# Patient Record
Sex: MALE | Race: WHITE | NOT HISPANIC OR LATINO | URBAN - METROPOLITAN AREA
[De-identification: names, ages, dates, MRNs, and addresses within clinical notes are randomized per-mention and may not be internally consistent; named-entity substitution may affect disease eponyms.]

---

## 2017-04-05 ENCOUNTER — EMERGENCY (EMERGENCY)
Facility: HOSPITAL | Age: 22
LOS: 1 days | Discharge: HOME | End: 2017-04-05
Attending: EMERGENCY MEDICINE
Payer: COMMERCIAL

## 2017-04-05 VITALS
TEMPERATURE: 98 F | WEIGHT: 207.01 LBS | DIASTOLIC BLOOD PRESSURE: 74 MMHG | SYSTOLIC BLOOD PRESSURE: 131 MMHG | OXYGEN SATURATION: 98 % | RESPIRATION RATE: 20 BRPM | HEIGHT: 66 IN | HEART RATE: 94 BPM

## 2017-04-05 DIAGNOSIS — T56.1X1A: ICD-10-CM

## 2017-04-05 DIAGNOSIS — Z90.89 ACQUIRED ABSENCE OF OTHER ORGANS: ICD-10-CM

## 2017-04-05 DIAGNOSIS — R20.2 PARESTHESIA OF SKIN: ICD-10-CM

## 2017-04-05 DIAGNOSIS — Z98.89 OTHER SPECIFIED POSTPROCEDURAL STATES: Chronic | ICD-10-CM

## 2017-04-05 LAB
ALBUMIN SERPL ELPH-MCNC: 4.4 G/DL — SIGNIFICANT CHANGE UP (ref 3.3–5.2)
ALP SERPL-CCNC: 62 U/L — SIGNIFICANT CHANGE UP (ref 40–120)
ALT FLD-CCNC: 26 U/L — SIGNIFICANT CHANGE UP
AMPHET UR-MCNC: NEGATIVE — SIGNIFICANT CHANGE UP
ANION GAP SERPL CALC-SCNC: 12 MMOL/L — SIGNIFICANT CHANGE UP (ref 5–17)
APPEARANCE UR: CLEAR — SIGNIFICANT CHANGE UP
AST SERPL-CCNC: 25 U/L — SIGNIFICANT CHANGE UP
BARBITURATES UR SCN-MCNC: NEGATIVE — SIGNIFICANT CHANGE UP
BASOPHILS # BLD AUTO: 0 K/UL — SIGNIFICANT CHANGE UP (ref 0–0.2)
BASOPHILS NFR BLD AUTO: 0.3 % — SIGNIFICANT CHANGE UP (ref 0–2)
BENZODIAZ UR-MCNC: NEGATIVE — SIGNIFICANT CHANGE UP
BILIRUB SERPL-MCNC: 0.5 MG/DL — SIGNIFICANT CHANGE UP (ref 0.4–2)
BILIRUB UR-MCNC: NEGATIVE — SIGNIFICANT CHANGE UP
BUN SERPL-MCNC: 19 MG/DL — SIGNIFICANT CHANGE UP (ref 8–20)
CALCIUM SERPL-MCNC: 9.9 MG/DL — SIGNIFICANT CHANGE UP (ref 8.6–10.2)
CHLORIDE SERPL-SCNC: 98 MMOL/L — SIGNIFICANT CHANGE UP (ref 98–107)
CO2 SERPL-SCNC: 30 MMOL/L — HIGH (ref 22–29)
COCAINE METAB.OTHER UR-MCNC: NEGATIVE — SIGNIFICANT CHANGE UP
COLOR SPEC: YELLOW — SIGNIFICANT CHANGE UP
CREAT SERPL-MCNC: 0.9 MG/DL — SIGNIFICANT CHANGE UP (ref 0.5–1.3)
DIFF PNL FLD: NEGATIVE — SIGNIFICANT CHANGE UP
EOSINOPHIL # BLD AUTO: 0.2 K/UL — SIGNIFICANT CHANGE UP (ref 0–0.5)
EOSINOPHIL NFR BLD AUTO: 2.3 % — SIGNIFICANT CHANGE UP (ref 0–5)
GLUCOSE SERPL-MCNC: 87 MG/DL — SIGNIFICANT CHANGE UP (ref 70–115)
GLUCOSE UR QL: NEGATIVE MG/DL — SIGNIFICANT CHANGE UP
HCT VFR BLD CALC: 43.3 % — SIGNIFICANT CHANGE UP (ref 42–52)
HGB BLD-MCNC: 14.6 G/DL — SIGNIFICANT CHANGE UP (ref 14–18)
KETONES UR-MCNC: NEGATIVE — SIGNIFICANT CHANGE UP
LEUKOCYTE ESTERASE UR-ACNC: NEGATIVE — SIGNIFICANT CHANGE UP
LYMPHOCYTES # BLD AUTO: 2.4 K/UL — SIGNIFICANT CHANGE UP (ref 1–4.8)
LYMPHOCYTES # BLD AUTO: 25.1 % — SIGNIFICANT CHANGE UP (ref 20–55)
MCHC RBC-ENTMCNC: 28.9 PG — SIGNIFICANT CHANGE UP (ref 27–31)
MCHC RBC-ENTMCNC: 33.7 G/DL — SIGNIFICANT CHANGE UP (ref 32–36)
MCV RBC AUTO: 85.6 FL — SIGNIFICANT CHANGE UP (ref 80–94)
METHADONE UR-MCNC: NEGATIVE — SIGNIFICANT CHANGE UP
MONOCYTES # BLD AUTO: 0.6 K/UL — SIGNIFICANT CHANGE UP (ref 0–0.8)
MONOCYTES NFR BLD AUTO: 6.1 % — SIGNIFICANT CHANGE UP (ref 3–10)
NEUTROPHILS # BLD AUTO: 6.3 K/UL — SIGNIFICANT CHANGE UP (ref 1.8–8)
NEUTROPHILS NFR BLD AUTO: 65.9 % — SIGNIFICANT CHANGE UP (ref 37–73)
NITRITE UR-MCNC: NEGATIVE — SIGNIFICANT CHANGE UP
OPIATES UR-MCNC: NEGATIVE — SIGNIFICANT CHANGE UP
PCP SPEC-MCNC: SIGNIFICANT CHANGE UP
PCP UR-MCNC: NEGATIVE — SIGNIFICANT CHANGE UP
PH UR: 6 — SIGNIFICANT CHANGE UP (ref 4.8–8)
PLATELET # BLD AUTO: 286 K/UL — SIGNIFICANT CHANGE UP (ref 150–400)
POTASSIUM SERPL-MCNC: 4.4 MMOL/L — SIGNIFICANT CHANGE UP (ref 3.5–5.3)
POTASSIUM SERPL-SCNC: 4.4 MMOL/L — SIGNIFICANT CHANGE UP (ref 3.5–5.3)
PROT SERPL-MCNC: 7.8 G/DL — SIGNIFICANT CHANGE UP (ref 6.6–8.7)
PROT UR-MCNC: NEGATIVE MG/DL — SIGNIFICANT CHANGE UP
RBC # BLD: 5.06 M/UL — SIGNIFICANT CHANGE UP (ref 4.6–6.2)
RBC # FLD: 14 % — SIGNIFICANT CHANGE UP (ref 11–15.6)
SODIUM SERPL-SCNC: 140 MMOL/L — SIGNIFICANT CHANGE UP (ref 135–145)
SP GR SPEC: 1.01 — SIGNIFICANT CHANGE UP (ref 1.01–1.02)
THC UR QL: NEGATIVE — SIGNIFICANT CHANGE UP
UROBILINOGEN FLD QL: NEGATIVE MG/DL — SIGNIFICANT CHANGE UP
WBC # BLD: 9.6 K/UL — SIGNIFICANT CHANGE UP (ref 4.8–10.8)
WBC # FLD AUTO: 9.6 K/UL — SIGNIFICANT CHANGE UP (ref 4.8–10.8)

## 2017-04-05 PROCEDURE — 85027 COMPLETE CBC AUTOMATED: CPT

## 2017-04-05 PROCEDURE — 99283 EMERGENCY DEPT VISIT LOW MDM: CPT

## 2017-04-05 PROCEDURE — 80307 DRUG TEST PRSMV CHEM ANLYZR: CPT

## 2017-04-05 PROCEDURE — 81003 URINALYSIS AUTO W/O SCOPE: CPT

## 2017-04-05 PROCEDURE — 99284 EMERGENCY DEPT VISIT MOD MDM: CPT

## 2017-04-05 PROCEDURE — 83825 ASSAY OF MERCURY: CPT

## 2017-04-05 PROCEDURE — 80053 COMPREHEN METABOLIC PANEL: CPT

## 2017-04-05 NOTE — ED STATDOCS - OBJECTIVE STATEMENT
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 Pt reports that he experienced frequent mood swings, intermittent diffuse red rash across his body, and paranoia when attempting to sleep yesterday evening. He also awoke this morning with intermittent episodes of tingling in his hands. Pt reports that he called his neurologist, who asked if he had been eating a lot of sushi/seafood lately. Pt replied that he has been eating sushi and tuna frequently x 1 week, and his neurologist advised pt to be evaluated in the ED and tested for mercury poisoning. Denies fever, n/v, abd pain, CP, SOB. No further complaints at this time.

## 2017-04-05 NOTE — ED ADULT TRIAGE NOTE - CHIEF COMPLAINT QUOTE
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

## 2017-04-05 NOTE — ED ADULT NURSE NOTE - CHIEF COMPLAINT QUOTE
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

## 2017-04-05 NOTE — ED STATDOCS - CARE PLAN
Principal Discharge DX:	Irritability Principal Discharge DX:	Mercury overdose, undetermined intent, sequela

## 2017-04-05 NOTE — ED STATDOCS - ATTENDING CONTRIBUTION TO CARE
I, Chilango Belle, performed the initial face to face bedside interview with this patient regarding history of present illness, review of symptoms and relevant past medical, social and family history.  I completed an independent physical examination.  I was the initial provider who evaluated this patient. I have signed out the follow up of any pending tests (i.e. labs, radiological studies) to the ACP.  I have communicated the patient’s plan of care and disposition with the ACP.  The history, relevant review of systems, past medical and surgical history, medical decision making, and physical examination was documented by the scribe in my presence and I attest to the accuracy of the documentation.

## 2017-04-05 NOTE — ED STATDOCS - NS ED MD SCRIBE ATTENDING SCRIBE SECTIONS
PHYSICAL EXAM/PAST MEDICAL/SURGICAL/SOCIAL HISTORY/VITAL SIGNS( Pullset)/HISTORY OF PRESENT ILLNESS/REVIEW OF SYSTEMS/DISPOSITION/HIV

## 2017-04-07 LAB — MERCURY SERPL-MCNC: 16.5 UG/L — HIGH (ref 0–14.9)

## 2018-02-11 ENCOUNTER — EMERGENCY (EMERGENCY)
Facility: HOSPITAL | Age: 23
LOS: 1 days | Discharge: HOME | End: 2018-02-11
Attending: STUDENT IN AN ORGANIZED HEALTH CARE EDUCATION/TRAINING PROGRAM | Admitting: STUDENT IN AN ORGANIZED HEALTH CARE EDUCATION/TRAINING PROGRAM
Payer: COMMERCIAL

## 2018-02-11 VITALS
HEIGHT: 63 IN | OXYGEN SATURATION: 99 % | SYSTOLIC BLOOD PRESSURE: 130 MMHG | TEMPERATURE: 98 F | HEART RATE: 79 BPM | RESPIRATION RATE: 22 BRPM | WEIGHT: 220.02 LBS | DIASTOLIC BLOOD PRESSURE: 83 MMHG

## 2018-02-11 DIAGNOSIS — Z98.89 OTHER SPECIFIED POSTPROCEDURAL STATES: Chronic | ICD-10-CM

## 2018-02-11 PROCEDURE — 99285 EMERGENCY DEPT VISIT HI MDM: CPT

## 2018-02-11 RX ORDER — SODIUM CHLORIDE 9 MG/ML
1000 INJECTION INTRAMUSCULAR; INTRAVENOUS; SUBCUTANEOUS
Qty: 0 | Refills: 0 | Status: DISCONTINUED | OUTPATIENT
Start: 2018-02-11 | End: 2018-02-15

## 2018-02-11 RX ORDER — SODIUM CHLORIDE 9 MG/ML
1000 INJECTION INTRAMUSCULAR; INTRAVENOUS; SUBCUTANEOUS ONCE
Qty: 0 | Refills: 0 | Status: COMPLETED | OUTPATIENT
Start: 2018-02-11 | End: 2018-02-11

## 2018-02-11 RX ORDER — SODIUM CHLORIDE 9 MG/ML
3 INJECTION INTRAMUSCULAR; INTRAVENOUS; SUBCUTANEOUS ONCE
Qty: 0 | Refills: 0 | Status: COMPLETED | OUTPATIENT
Start: 2018-02-11 | End: 2018-02-11

## 2018-02-11 RX ORDER — FAMOTIDINE 10 MG/ML
20 INJECTION INTRAVENOUS ONCE
Qty: 0 | Refills: 0 | Status: COMPLETED | OUTPATIENT
Start: 2018-02-11 | End: 2018-02-11

## 2018-02-11 RX ORDER — KETOROLAC TROMETHAMINE 30 MG/ML
30 SYRINGE (ML) INJECTION ONCE
Qty: 0 | Refills: 0 | Status: DISCONTINUED | OUTPATIENT
Start: 2018-02-11 | End: 2018-02-11

## 2018-02-11 RX ORDER — ONDANSETRON 8 MG/1
4 TABLET, FILM COATED ORAL ONCE
Qty: 0 | Refills: 0 | Status: COMPLETED | OUTPATIENT
Start: 2018-02-11 | End: 2018-02-12

## 2018-02-11 NOTE — ED PROVIDER NOTE - CONSTITUTIONAL, MLM
normal... Well appearing, well nourished, awake, alert, oriented to person, place, time/situation and in no apparent distress. Well appearing, well nourished, awake, alert, oriented to person, place, time/situation. No obvious distress.

## 2018-02-11 NOTE — ED PROVIDER NOTE - OBJECTIVE STATEMENT
y/o male pt with PMHx  PSHx appendectomy presents to the ED  ra rodarte pain onset 3 00 today.   diarhea blood in stool   on floor  cutting open stomacj   couldn't drive or move  pain worsens   sitting here     sharp pain regularl  grt backed up colnoscopy done  dr ramya molina 29 y/o male pt with PMHx Kleine-morin syndrome and PSHx appendectomy presents to the ED c/o abdominal pain onset 13:00 today. Pt describes pain as severe and sharp and states "it feels like someone is cutting open my stomach". He notes the pain worsens while driving and with movement. Pt states diarrhea, blood in stool, and irregular bowel movements is normal for him. Pt currently takes Lorazepam. No further complaints at this   PCP: Dr. Acevedo

## 2018-02-11 NOTE — ED PROVIDER NOTE - GASTROINTESTINAL, MLM
Abdomen soft, non-tender, no guarding. Belly significantly tender. No guard, no rebound, normal active bowel sounds.

## 2018-02-11 NOTE — ED PROVIDER NOTE - PROGRESS NOTE DETAILS
Labs are as noted. Patient with elevated creatine kinase. Upon further question patient states he recent has been working out and has been using the pre-workout supplements regularly. Labs are as noted. Patient with elevated creatine kinase. Upon further question patient states he recent has been working out heavily and has been using the pre-workout supplements and protein shakes regularly. Last work-out was two days ago. Patient states he is feeling better but feels pain is returning. He states it is sharp in nature. CT and lab results discussed with patient and mother. Will medicate further, hydrate, and re-check ck. Labs are as noted. Patient with elevated creatine kinase. Upon further question patient states he recent has been working out heavily and has been using the pre-workout supplements and protein shakes regularly. Last work-out was two days ago. Rhabdomyolysis likely secondary to working out with some component due to diet intake.

## 2018-02-11 NOTE — ED PROVIDER NOTE - CARE PLAN
Principal Discharge DX:	Mesenteric adenitis Principal Discharge DX:	Mesenteric adenitis  Secondary Diagnosis:	Rhabdomyolysis

## 2018-02-12 VITALS
HEART RATE: 64 BPM | TEMPERATURE: 99 F | SYSTOLIC BLOOD PRESSURE: 100 MMHG | OXYGEN SATURATION: 98 % | RESPIRATION RATE: 18 BRPM | DIASTOLIC BLOOD PRESSURE: 54 MMHG

## 2018-02-12 LAB
ALBUMIN SERPL ELPH-MCNC: 4.8 G/DL — SIGNIFICANT CHANGE UP (ref 3.3–5.2)
ALP SERPL-CCNC: 50 U/L — SIGNIFICANT CHANGE UP (ref 40–120)
ALT FLD-CCNC: 39 U/L — SIGNIFICANT CHANGE UP
AMPHET UR-MCNC: NEGATIVE — SIGNIFICANT CHANGE UP
ANION GAP SERPL CALC-SCNC: 17 MMOL/L — SIGNIFICANT CHANGE UP (ref 5–17)
APPEARANCE UR: CLEAR — SIGNIFICANT CHANGE UP
AST SERPL-CCNC: 63 U/L — HIGH
BARBITURATES UR SCN-MCNC: NEGATIVE — SIGNIFICANT CHANGE UP
BASOPHILS # BLD AUTO: 0 K/UL — SIGNIFICANT CHANGE UP (ref 0–0.2)
BASOPHILS NFR BLD AUTO: 0.4 % — SIGNIFICANT CHANGE UP (ref 0–2)
BENZODIAZ UR-MCNC: NEGATIVE — SIGNIFICANT CHANGE UP
BILIRUB SERPL-MCNC: 0.6 MG/DL — SIGNIFICANT CHANGE UP (ref 0.4–2)
BILIRUB UR-MCNC: NEGATIVE — SIGNIFICANT CHANGE UP
BUN SERPL-MCNC: 18 MG/DL — SIGNIFICANT CHANGE UP (ref 8–20)
CALCIUM SERPL-MCNC: 9.9 MG/DL — SIGNIFICANT CHANGE UP (ref 8.6–10.2)
CHLORIDE SERPL-SCNC: 98 MMOL/L — SIGNIFICANT CHANGE UP (ref 98–107)
CK MB CFR SERPL CALC: 2.6 NG/ML — SIGNIFICANT CHANGE UP (ref 0–6.7)
CK MB CFR SERPL CALC: 2.9 NG/ML — SIGNIFICANT CHANGE UP (ref 0–6.7)
CK SERPL-CCNC: 1589 U/L — HIGH (ref 30–200)
CK SERPL-CCNC: 2231 U/L — HIGH (ref 30–200)
CO2 SERPL-SCNC: 24 MMOL/L — SIGNIFICANT CHANGE UP (ref 22–29)
COCAINE METAB.OTHER UR-MCNC: NEGATIVE — SIGNIFICANT CHANGE UP
COLOR SPEC: YELLOW — SIGNIFICANT CHANGE UP
CREAT SERPL-MCNC: 0.86 MG/DL — SIGNIFICANT CHANGE UP (ref 0.5–1.3)
DIFF PNL FLD: NEGATIVE — SIGNIFICANT CHANGE UP
EOSINOPHIL # BLD AUTO: 0.9 K/UL — HIGH (ref 0–0.5)
EOSINOPHIL NFR BLD AUTO: 7.3 % — HIGH (ref 0–5)
GLUCOSE SERPL-MCNC: 84 MG/DL — SIGNIFICANT CHANGE UP (ref 70–115)
GLUCOSE UR QL: NEGATIVE MG/DL — SIGNIFICANT CHANGE UP
HCT VFR BLD CALC: 46.7 % — SIGNIFICANT CHANGE UP (ref 42–52)
HGB BLD-MCNC: 15.5 G/DL — SIGNIFICANT CHANGE UP (ref 14–18)
KETONES UR-MCNC: NEGATIVE — SIGNIFICANT CHANGE UP
LEUKOCYTE ESTERASE UR-ACNC: NEGATIVE — SIGNIFICANT CHANGE UP
LIDOCAIN IGE QN: 18 U/L — LOW (ref 22–51)
LYMPHOCYTES # BLD AUTO: 28.5 % — SIGNIFICANT CHANGE UP (ref 20–55)
LYMPHOCYTES # BLD AUTO: 3.6 K/UL — SIGNIFICANT CHANGE UP (ref 1–4.8)
MCHC RBC-ENTMCNC: 28.8 PG — SIGNIFICANT CHANGE UP (ref 27–31)
MCHC RBC-ENTMCNC: 33.2 G/DL — SIGNIFICANT CHANGE UP (ref 32–36)
MCV RBC AUTO: 86.6 FL — SIGNIFICANT CHANGE UP (ref 80–94)
METHADONE UR-MCNC: NEGATIVE — SIGNIFICANT CHANGE UP
MONOCYTES # BLD AUTO: 0.7 K/UL — SIGNIFICANT CHANGE UP (ref 0–0.8)
MONOCYTES NFR BLD AUTO: 5.8 % — SIGNIFICANT CHANGE UP (ref 3–10)
NEUTROPHILS # BLD AUTO: 7.3 K/UL — SIGNIFICANT CHANGE UP (ref 1.8–8)
NEUTROPHILS NFR BLD AUTO: 57.7 % — SIGNIFICANT CHANGE UP (ref 37–73)
NITRITE UR-MCNC: NEGATIVE — SIGNIFICANT CHANGE UP
OPIATES UR-MCNC: NEGATIVE — SIGNIFICANT CHANGE UP
PCP SPEC-MCNC: SIGNIFICANT CHANGE UP
PCP UR-MCNC: NEGATIVE — SIGNIFICANT CHANGE UP
PH UR: 6 — SIGNIFICANT CHANGE UP (ref 5–8)
PLATELET # BLD AUTO: 325 K/UL — SIGNIFICANT CHANGE UP (ref 150–400)
POTASSIUM SERPL-MCNC: 4.8 MMOL/L — SIGNIFICANT CHANGE UP (ref 3.5–5.3)
POTASSIUM SERPL-SCNC: 4.8 MMOL/L — SIGNIFICANT CHANGE UP (ref 3.5–5.3)
PROT SERPL-MCNC: 8.5 G/DL — SIGNIFICANT CHANGE UP (ref 6.6–8.7)
PROT UR-MCNC: NEGATIVE MG/DL — SIGNIFICANT CHANGE UP
RBC # BLD: 5.39 M/UL — SIGNIFICANT CHANGE UP (ref 4.6–6.2)
RBC # FLD: 13.5 % — SIGNIFICANT CHANGE UP (ref 11–15.6)
SODIUM SERPL-SCNC: 139 MMOL/L — SIGNIFICANT CHANGE UP (ref 135–145)
SP GR SPEC: 1.01 — SIGNIFICANT CHANGE UP (ref 1.01–1.02)
THC UR QL: NEGATIVE — SIGNIFICANT CHANGE UP
UROBILINOGEN FLD QL: NEGATIVE MG/DL — SIGNIFICANT CHANGE UP
WBC # BLD: 12.7 K/UL — HIGH (ref 4.8–10.8)
WBC # FLD AUTO: 12.7 K/UL — HIGH (ref 4.8–10.8)

## 2018-02-12 PROCEDURE — 80307 DRUG TEST PRSMV CHEM ANLYZR: CPT

## 2018-02-12 PROCEDURE — 74177 CT ABD & PELVIS W/CONTRAST: CPT | Mod: 26

## 2018-02-12 PROCEDURE — 82553 CREATINE MB FRACTION: CPT

## 2018-02-12 PROCEDURE — 96374 THER/PROPH/DIAG INJ IV PUSH: CPT | Mod: XU

## 2018-02-12 PROCEDURE — 80053 COMPREHEN METABOLIC PANEL: CPT

## 2018-02-12 PROCEDURE — 36415 COLL VENOUS BLD VENIPUNCTURE: CPT

## 2018-02-12 PROCEDURE — 96375 TX/PRO/DX INJ NEW DRUG ADDON: CPT

## 2018-02-12 PROCEDURE — 85027 COMPLETE CBC AUTOMATED: CPT

## 2018-02-12 PROCEDURE — 83690 ASSAY OF LIPASE: CPT

## 2018-02-12 PROCEDURE — 82550 ASSAY OF CK (CPK): CPT

## 2018-02-12 PROCEDURE — 74177 CT ABD & PELVIS W/CONTRAST: CPT

## 2018-02-12 PROCEDURE — 81001 URINALYSIS AUTO W/SCOPE: CPT

## 2018-02-12 PROCEDURE — 96376 TX/PRO/DX INJ SAME DRUG ADON: CPT

## 2018-02-12 PROCEDURE — 99284 EMERGENCY DEPT VISIT MOD MDM: CPT | Mod: 25

## 2018-02-12 RX ORDER — IBUPROFEN 200 MG
1 TABLET ORAL
Qty: 20 | Refills: 0
Start: 2018-02-12 | End: 2018-02-16

## 2018-02-12 RX ORDER — MORPHINE SULFATE 50 MG/1
6 CAPSULE, EXTENDED RELEASE ORAL ONCE
Qty: 0 | Refills: 0 | Status: DISCONTINUED | OUTPATIENT
Start: 2018-02-12 | End: 2018-02-12

## 2018-02-12 RX ORDER — OXYCODONE AND ACETAMINOPHEN 5; 325 MG/1; MG/1
1 TABLET ORAL
Qty: 4 | Refills: 0
Start: 2018-02-12 | End: 2018-02-12

## 2018-02-12 RX ORDER — SODIUM CHLORIDE 9 MG/ML
2000 INJECTION INTRAMUSCULAR; INTRAVENOUS; SUBCUTANEOUS ONCE
Qty: 0 | Refills: 0 | Status: COMPLETED | OUTPATIENT
Start: 2018-02-12 | End: 2018-02-12

## 2018-02-12 RX ADMIN — MORPHINE SULFATE 6 MILLIGRAM(S): 50 CAPSULE, EXTENDED RELEASE ORAL at 05:06

## 2018-02-12 RX ADMIN — SODIUM CHLORIDE 125 MILLILITER(S): 9 INJECTION INTRAMUSCULAR; INTRAVENOUS; SUBCUTANEOUS at 00:57

## 2018-02-12 RX ADMIN — SODIUM CHLORIDE 1000 MILLILITER(S): 9 INJECTION INTRAMUSCULAR; INTRAVENOUS; SUBCUTANEOUS at 00:57

## 2018-02-12 RX ADMIN — FAMOTIDINE 20 MILLIGRAM(S): 10 INJECTION INTRAVENOUS at 00:57

## 2018-02-12 RX ADMIN — Medication 30 MILLIGRAM(S): at 01:12

## 2018-02-12 RX ADMIN — ONDANSETRON 4 MILLIGRAM(S): 8 TABLET, FILM COATED ORAL at 00:56

## 2018-02-12 RX ADMIN — MORPHINE SULFATE 6 MILLIGRAM(S): 50 CAPSULE, EXTENDED RELEASE ORAL at 01:58

## 2018-02-12 RX ADMIN — SODIUM CHLORIDE 3 MILLILITER(S): 9 INJECTION INTRAMUSCULAR; INTRAVENOUS; SUBCUTANEOUS at 00:57

## 2018-02-12 RX ADMIN — SODIUM CHLORIDE 2000 MILLILITER(S): 9 INJECTION INTRAMUSCULAR; INTRAVENOUS; SUBCUTANEOUS at 03:33

## 2018-02-12 RX ADMIN — Medication 30 MILLIGRAM(S): at 00:57

## 2018-02-12 NOTE — ED ADULT NURSE REASSESSMENT NOTE - NS ED NURSE REASSESS COMMENT FT1
Pt cleared for discharge , IV removed , no further bleeding noted from IV site , VSS stable  , no distress  noted , pt requesting w/c for discharge , mother at the bedside to take pt home

## 2018-02-12 NOTE — ED ADULT NURSE NOTE - OBJECTIVE STATEMENT
pt reports epigastric pain, diarrhea, nausea starting earlier today. pt is pale and diaphoretic at this time. a and o x3. anxious, sitting in bed. breathing even and unlabored. pt reports he has been taking c4 workout supplement for 3 weeks. pt has no other complaints at this time. will continue to monitor.

## 2018-02-12 NOTE — ED ADULT NURSE NOTE - CHPI ED SYMPTOMS NEG
no hematuria/no fever/no burning urination/no abdominal distension/no blood in stool/no dysuria/no vomiting/no chills

## 2018-10-13 ENCOUNTER — TRANSCRIPTION ENCOUNTER (OUTPATIENT)
Age: 23
End: 2018-10-13

## 2019-12-28 ENCOUNTER — EMERGENCY (EMERGENCY)
Facility: HOSPITAL | Age: 24
LOS: 1 days | Discharge: HOME | End: 2019-12-28
Attending: EMERGENCY MEDICINE
Payer: COMMERCIAL

## 2019-12-28 ENCOUNTER — TRANSCRIPTION ENCOUNTER (OUTPATIENT)
Age: 24
End: 2019-12-28

## 2019-12-28 ENCOUNTER — EMERGENCY (EMERGENCY)
Facility: HOSPITAL | Age: 24
LOS: 1 days | Discharge: HOME | End: 2019-12-28

## 2019-12-28 VITALS
SYSTOLIC BLOOD PRESSURE: 107 MMHG | OXYGEN SATURATION: 98 % | DIASTOLIC BLOOD PRESSURE: 73 MMHG | HEIGHT: 63 IN | TEMPERATURE: 99 F | RESPIRATION RATE: 20 BRPM | WEIGHT: 214.95 LBS | HEART RATE: 120 BPM

## 2019-12-28 VITALS
RESPIRATION RATE: 20 BRPM | HEART RATE: 78 BPM | OXYGEN SATURATION: 100 % | TEMPERATURE: 98 F | DIASTOLIC BLOOD PRESSURE: 71 MMHG | SYSTOLIC BLOOD PRESSURE: 107 MMHG

## 2019-12-28 DIAGNOSIS — Z98.89 OTHER SPECIFIED POSTPROCEDURAL STATES: Chronic | ICD-10-CM

## 2019-12-28 PROCEDURE — 96372 THER/PROPH/DIAG INJ SC/IM: CPT

## 2019-12-28 PROCEDURE — 99284 EMERGENCY DEPT VISIT MOD MDM: CPT | Mod: 25

## 2019-12-28 PROCEDURE — 74176 CT ABD & PELVIS W/O CONTRAST: CPT

## 2019-12-28 PROCEDURE — 99284 EMERGENCY DEPT VISIT MOD MDM: CPT

## 2019-12-28 PROCEDURE — 74176 CT ABD & PELVIS W/O CONTRAST: CPT | Mod: 26

## 2019-12-28 RX ORDER — KETOROLAC TROMETHAMINE 30 MG/ML
30 SYRINGE (ML) INJECTION ONCE
Refills: 0 | Status: DISCONTINUED | OUTPATIENT
Start: 2019-12-28 | End: 2019-12-28

## 2019-12-28 RX ORDER — ONDANSETRON 8 MG/1
4 TABLET, FILM COATED ORAL ONCE
Refills: 0 | Status: COMPLETED | OUTPATIENT
Start: 2019-12-28 | End: 2019-12-28

## 2019-12-28 RX ORDER — ONDANSETRON 8 MG/1
4 TABLET, FILM COATED ORAL ONCE
Refills: 0 | Status: DISCONTINUED | OUTPATIENT
Start: 2019-12-28 | End: 2019-12-28

## 2019-12-28 RX ORDER — SODIUM CHLORIDE 9 MG/ML
1000 INJECTION, SOLUTION INTRAVENOUS ONCE
Refills: 0 | Status: DISCONTINUED | OUTPATIENT
Start: 2019-12-28 | End: 2019-12-28

## 2019-12-28 RX ADMIN — Medication 30 MILLIGRAM(S): at 21:09

## 2019-12-28 RX ADMIN — ONDANSETRON 4 MILLIGRAM(S): 8 TABLET, FILM COATED ORAL at 21:08

## 2019-12-28 NOTE — ED STATDOCS - NSFOLLOWUPINSTRUCTIONS_ED_ALL_ED_FT
- Follow up with your doctor within 2-3 days.   - Return to the ED for any new or worsening symptoms.   - Ensure adequate hydration, drink plenty of water, gatorade, pedialyte    Diarrhea    Diarrhea is frequent loose or watery bowel movements that has many causes. Diarrhea can make you feel weak and cause you to become dehydrated. Diarrhea typically lasts 2–3 days, but can last longer if it is a sign of something more serious. Drink clear fluids to prevent dehydration. Eat bland, easy-to-digest foods as tolerated.     SEEK IMMEDIATE MEDICAL CARE IF YOU HAVE ANY OF THE FOLLOWING SYMPTOMS: high fevers, lightheadedness/dizziness, chest pain, black or bloody stools, shortness of breath, severe abdominal or back pain, or any signs of dehydration.     Please return to the emergency department immediately should you feel worse in any way or have any of the following symptoms:    •	especially increased or different pain  •	 fevers  •	persistent vomiting  •	shaking chills    Please return to the emergency department for a recheck in 12 hours so we can re-evaluate you and ensure that you are not developing a problem that would require surgery or hospitalization.      Please follow up with the Doctor listed within the time frame specified. Thank you for coming to the emergency department. We hope you are feeling improved and continue to get better. Have a nice day.

## 2019-12-28 NOTE — ED STATDOCS - PROGRESS NOTE DETAILS
PA note: PT evaluated by intake physician. HPI/PE/ROS as noted above. Will follow up plan per intake physician. CT results negative for any acute intraabdominal pathology PA note: CT negative. Pt does not want IV/needle stick. Pt hydrating orally. Will give zofran odt, toradol IM and send stool pcr. Pt provided copy of ct report, educated on return precautions and instructed to f/u PMD.

## 2019-12-28 NOTE — ED STATDOCS - CLINICAL SUMMARY MEDICAL DECISION MAKING FREE TEXT BOX
ABD pain, with N/V/D x 3 days; Iv fluids, Zofran, labs and consider ABD CT with no improvement in symptoms.

## 2019-12-28 NOTE — ED ADULT NURSE NOTE - CHIEF COMPLAINT QUOTE
Patient arrived to ED today with c/o abdominal pains, vomiting, diarrhea, for the past 4 days.  Patient was seen by PMD office two days ago.  Patient states after eating steak 4 days ago.

## 2019-12-28 NOTE — ED STATDOCS - PATIENT PORTAL LINK FT
You can access the FollowMyHealth Patient Portal offered by [facility] by registering at the following website: [email or web address]. By joining [employer]’s FollowMyHealth portal, you will also be able to view your health information using other applications (apps) compatible with our system.

## 2019-12-28 NOTE — ED STATDOCS - OBJECTIVE STATEMENT
25 y/o F pt with PSHX appendectomy presents to ED c/o N/V/D and progressive, severe ABD pain x 3 days. No emesis today, but reports >25 episodes of non bloody diarrhea. (+)  Other family members at home with similar symptoms. Tolerating PO. No further complaints at this time.

## 2019-12-28 NOTE — ED ADULT NURSE NOTE - OBJECTIVE STATEMENT
Patient c/o cramping  abdominal pains, vomiting, diarrhea, for the past 4 days. Denies blood in stools/vomit,  symptoms. Per patient they ate "bad food on [place]yan," pain went away but came back more severe today. Patient was recently on ABX about 1 month ago for pneumonia.

## 2019-12-28 NOTE — ED STATDOCS - NS_ ATTENDINGSCRIBEDETAILS _ED_A_ED_FT
I, Marck Nieves, performed the initial face to face bedside interview with this patient regarding history of present illness, review of symptoms and relevant past medical, social and family history.  I completed an independent physical examination.  I was the initial provider who evaluated this patient. I have signed out the follow up of any pending tests (i.e. labs, radiological studies) to the ACP.  I have communicated the patient’s plan of care and disposition with the ACP.  The history, relevant review of systems, past medical and surgical history, medical decision making, and physical examination was documented by the scribe in my presence and I attest to the accuracy of the documentation.

## 2020-03-02 ENCOUNTER — APPOINTMENT (OUTPATIENT)
Dept: ORTHOPEDIC SURGERY | Facility: CLINIC | Age: 25
End: 2020-03-02
Payer: COMMERCIAL

## 2020-03-02 ENCOUNTER — FORM ENCOUNTER (OUTPATIENT)
Age: 25
End: 2020-03-02

## 2020-03-02 VITALS
DIASTOLIC BLOOD PRESSURE: 82 MMHG | BODY MASS INDEX: 38.98 KG/M2 | HEIGHT: 63 IN | HEART RATE: 100 BPM | WEIGHT: 220 LBS | SYSTOLIC BLOOD PRESSURE: 132 MMHG

## 2020-03-02 VITALS — TEMPERATURE: 98.1 F

## 2020-03-02 PROBLEM — Z00.00 ENCOUNTER FOR PREVENTIVE HEALTH EXAMINATION: Status: ACTIVE | Noted: 2020-03-02

## 2020-03-02 PROCEDURE — 73110 X-RAY EXAM OF WRIST: CPT | Mod: RT

## 2020-03-02 PROCEDURE — 73090 X-RAY EXAM OF FOREARM: CPT | Mod: RT

## 2020-03-02 PROCEDURE — 99203 OFFICE O/P NEW LOW 30 MIN: CPT

## 2020-03-02 NOTE — PHYSICAL EXAM
[UE/LE] : Sensory: Intact in bilateral upper & lower extremities [ALL] : dorsalis pedis, posterior tibial, femoral, popliteal, and radial 2+ and symmetric bilaterally [Normal] : Oriented to person, place, and time, insight and judgement were intact and the affect was normal [Poor Appearance] : well-appearing [Acute Distress] : not in acute distress [de-identified] : Skin warm and dry, no erythema, no wounds, no lesions\par Hand \par Cap refill intact \par senstation intact to fingers \par \par  \par Wrist\par positive tenderness to ulna aspect of wrist, \par ROM passive and active decreased due to pain, supination/pronation causing pain.  \par Fist squeeze decreased secondary to pain \par compartments soft compressible to forearm \par  \par Radial/Ulna/Medial Nerves intact \par pulse intact \par \par \par \par \par \par \par \par  [de-identified] : 2 view right forearm reveals no acute findings \par 3 view right wrist reveals no acute findings

## 2020-03-02 NOTE — HISTORY OF PRESENT ILLNESS
[Pain Location] : pain [de-identified] : Patient is a 24 year old male who presents c/o right wrist pain. patient states pain started gradually over past week however has increased significantly over past 24 hours. patient states pain is located to ulna portion of wrist and radiates to forearm and hand. patient admits to swelling, states using ibuprofen with no relief.  patient states any movement increases pain, using wrist brace with no relief. patient denies any trauma that started pain, however was doing a lot of lifting at gym prior to onset of pain.  patient denies fevers or chills, had difficulty making a fist due to pain. patient states feels like ulna is subluxing in wrist. patient denies numbness to hand or fingers

## 2020-03-02 NOTE — RETURN TO WORK/SCHOOL
[Work] : work [___ Weeks] : I will re-evaluate the patient in [unfilled] week(s), at which time I will provide an update to their current status [FreeTextEntry1] : Please excuse patient from work due to right wrist injury

## 2020-03-02 NOTE — DISCUSSION/SUMMARY
[de-identified] : Discussion had with patient - patient with severe pain, feeling of ulna subluxing, need MRI to evaluate possible TFCC tear vs tenosynovitis \par Patient will follow up with hand\par Patients questions were answered and patient is satisfied with today's visit\par

## 2020-03-03 ENCOUNTER — OUTPATIENT (OUTPATIENT)
Dept: OUTPATIENT SERVICES | Facility: HOSPITAL | Age: 25
LOS: 1 days | End: 2020-03-03
Payer: COMMERCIAL

## 2020-03-03 ENCOUNTER — APPOINTMENT (OUTPATIENT)
Dept: MRI IMAGING | Facility: CLINIC | Age: 25
End: 2020-03-03
Payer: COMMERCIAL

## 2020-03-03 DIAGNOSIS — M25.531 PAIN IN RIGHT WRIST: ICD-10-CM

## 2020-03-03 DIAGNOSIS — Z98.89 OTHER SPECIFIED POSTPROCEDURAL STATES: Chronic | ICD-10-CM

## 2020-03-03 PROCEDURE — 73221 MRI JOINT UPR EXTREM W/O DYE: CPT | Mod: 26,RT

## 2020-03-03 PROCEDURE — 73221 MRI JOINT UPR EXTREM W/O DYE: CPT

## 2020-03-03 RX ORDER — IBUPROFEN 800 MG/1
800 TABLET, FILM COATED ORAL 3 TIMES DAILY
Qty: 60 | Refills: 0 | Status: ACTIVE | COMMUNITY
Start: 2020-03-03 | End: 1900-01-01

## 2020-03-05 ENCOUNTER — APPOINTMENT (OUTPATIENT)
Dept: ORTHOPEDIC SURGERY | Facility: CLINIC | Age: 25
End: 2020-03-05
Payer: COMMERCIAL

## 2020-03-05 VITALS
HEIGHT: 63 IN | BODY MASS INDEX: 38.98 KG/M2 | HEART RATE: 94 BPM | DIASTOLIC BLOOD PRESSURE: 72 MMHG | SYSTOLIC BLOOD PRESSURE: 125 MMHG | WEIGHT: 220 LBS

## 2020-03-05 DIAGNOSIS — G47.13 RECURRENT HYPERSOMNIA: ICD-10-CM

## 2020-03-05 DIAGNOSIS — Z78.9 OTHER SPECIFIED HEALTH STATUS: ICD-10-CM

## 2020-03-05 PROCEDURE — 99213 OFFICE O/P EST LOW 20 MIN: CPT

## 2020-03-07 PROBLEM — Z78.9 DOES NOT USE ILLICIT DRUGS: Status: ACTIVE | Noted: 2020-03-05

## 2020-03-07 PROBLEM — G47.13 KLEINE-LEVIN SYNDROME: Status: RESOLVED | Noted: 2020-03-05 | Resolved: 2020-03-07

## 2020-03-09 DIAGNOSIS — M25.531 PAIN IN RIGHT WRIST: ICD-10-CM

## 2020-03-09 RX ORDER — MELOXICAM 15 MG/1
15 TABLET ORAL DAILY
Qty: 14 | Refills: 0 | Status: ACTIVE | COMMUNITY
Start: 2020-03-09 | End: 1900-01-01

## 2020-03-12 ENCOUNTER — APPOINTMENT (OUTPATIENT)
Dept: ORTHOPEDIC SURGERY | Facility: CLINIC | Age: 25
End: 2020-03-12
Payer: COMMERCIAL

## 2020-03-12 VITALS
HEART RATE: 108 BPM | BODY MASS INDEX: 38.98 KG/M2 | SYSTOLIC BLOOD PRESSURE: 127 MMHG | DIASTOLIC BLOOD PRESSURE: 82 MMHG | TEMPERATURE: 98.2 F | WEIGHT: 220 LBS | HEIGHT: 63 IN

## 2020-03-12 DIAGNOSIS — M65.241 CALCIFIC TENDINITIS, RIGHT HAND: ICD-10-CM

## 2020-03-12 DIAGNOSIS — M65.4 RADIAL STYLOID TENOSYNOVITIS [DE QUERVAIN]: ICD-10-CM

## 2020-03-12 PROCEDURE — 20526 THER INJECTION CARP TUNNEL: CPT | Mod: 59,RT

## 2020-03-12 PROCEDURE — 20550 NJX 1 TENDON SHEATH/LIGAMENT: CPT | Mod: RT

## 2020-03-12 PROCEDURE — 99214 OFFICE O/P EST MOD 30 MIN: CPT | Mod: 25

## 2020-03-12 RX ORDER — METHYLPREDNISOLONE 4 MG/1
4 TABLET ORAL
Qty: 1 | Refills: 0 | Status: ACTIVE | COMMUNITY
Start: 2020-03-09 | End: 1900-01-01

## 2020-03-17 ENCOUNTER — APPOINTMENT (OUTPATIENT)
Dept: ORTHOPEDIC SURGERY | Facility: CLINIC | Age: 25
End: 2020-03-17

## 2020-03-23 ENCOUNTER — APPOINTMENT (OUTPATIENT)
Dept: RHEUMATOLOGY | Facility: CLINIC | Age: 25
End: 2020-03-23

## 2020-04-06 ENCOUNTER — APPOINTMENT (OUTPATIENT)
Dept: RHEUMATOLOGY | Facility: CLINIC | Age: 25
End: 2020-04-06

## 2022-01-01 NOTE — ED STATDOCS - PROGRESS NOTE DETAILS
1.27 PA NOTE: Pt seen by intake physician and orders/plan reviewed. agree with intake HPI AND PE. Pt is a 22 yo male with PMHx of Klien-morin syndrome   PE: GEN: Awake, alert,  NAD,  EYES: PERRL CARDIAC: Reg rate and rhythm, S1,S2, RRR  RESP: No distress noted. Lungs CTA bilaterally no wheeze, ronchi, rales. ABD: soft, supple, non-tender, no guarding. . NEURO: AOx3, no focal deficits   PLAN: PA NOTE: Pt seen by intake physician and orders/plan reviewed. agree with intake HPI AND PE. Pt is a 20 yo male with PMHx of Klien-morin syndrome c/o intermittent numbness and tingling of extremities, paranoia, irritability and sleepiness x today. pt reports he spoke to his neurologist who advised him to come to ed for evaluation of mercury poisoning due to increased intake of fish recently. pt denies fever, chills, cp, sob, hallucinations, suicide or homicide ideation. NKDA  PE: GEN: Awake, alert,  NAD,  EYES: PERRL CARDIAC: Reg rate and rhythm, S1,S2, RRR  RESP: No distress noted. Lungs CTA bilaterally no wheeze, ronchi, rales. ABD: soft, supple, non-tender, no guarding. . NEURO: AOx3, no focal deficits   PLAN:pt is a 20 yo male with multiple medical complaints. pt PE was benign. pt labs WNL. mercury lab pending. pt advised to follow up with neurologist and stop intake of fish. pt verbalized understanding and agreement with plan and dx tao d.c discussed with attending. case reviewed mercury level minimally elevated. Case discussed with PA. Advised consult with poison control Contacted Poison Control. Reviewed case. Contacted Poison Control. Reviewed case. ** recommended a fasting mercury level and a 24 urine.  Attempted to contact patient on listed phone number. Individual hung up. No pickup on recall. Letter sent to be sent patient.

## 2022-11-29 ENCOUNTER — EMERGENCY (EMERGENCY)
Facility: HOSPITAL | Age: 27
LOS: 1 days | Discharge: HOME | End: 2022-11-29
Attending: EMERGENCY MEDICINE
Payer: COMMERCIAL

## 2022-11-29 VITALS
SYSTOLIC BLOOD PRESSURE: 150 MMHG | RESPIRATION RATE: 16 BRPM | WEIGHT: 220.02 LBS | HEART RATE: 103 BPM | OXYGEN SATURATION: 99 % | TEMPERATURE: 98 F | HEIGHT: 63 IN | DIASTOLIC BLOOD PRESSURE: 90 MMHG

## 2022-11-29 DIAGNOSIS — Z98.89 OTHER SPECIFIED POSTPROCEDURAL STATES: Chronic | ICD-10-CM

## 2022-11-29 PROCEDURE — 73030 X-RAY EXAM OF SHOULDER: CPT | Mod: 26,RT

## 2022-11-29 PROCEDURE — 72131 CT LUMBAR SPINE W/O DYE: CPT | Mod: 26,MA

## 2022-11-29 PROCEDURE — 99284 EMERGENCY DEPT VISIT MOD MDM: CPT

## 2022-11-29 PROCEDURE — 72131 CT LUMBAR SPINE W/O DYE: CPT | Mod: MA

## 2022-11-29 PROCEDURE — 72128 CT CHEST SPINE W/O DYE: CPT | Mod: 26,MA

## 2022-11-29 PROCEDURE — 73030 X-RAY EXAM OF SHOULDER: CPT

## 2022-11-29 PROCEDURE — 73010 X-RAY EXAM OF SHOULDER BLADE: CPT | Mod: 26

## 2022-11-29 PROCEDURE — 71250 CT THORAX DX C-: CPT | Mod: MA

## 2022-11-29 PROCEDURE — 73010 X-RAY EXAM OF SHOULDER BLADE: CPT

## 2022-11-29 PROCEDURE — 71250 CT THORAX DX C-: CPT | Mod: 26,MA

## 2022-11-29 PROCEDURE — 99284 EMERGENCY DEPT VISIT MOD MDM: CPT | Mod: 25

## 2022-11-29 RX ORDER — IBUPROFEN 200 MG
400 TABLET ORAL ONCE
Refills: 0 | Status: COMPLETED | OUTPATIENT
Start: 2022-11-29 | End: 2022-11-29

## 2022-11-29 RX ORDER — LIDOCAINE 4 G/100G
1 CREAM TOPICAL ONCE
Refills: 0 | Status: COMPLETED | OUTPATIENT
Start: 2022-11-29 | End: 2022-11-29

## 2022-11-29 RX ORDER — ACETAMINOPHEN 500 MG
650 TABLET ORAL ONCE
Refills: 0 | Status: COMPLETED | OUTPATIENT
Start: 2022-11-29 | End: 2022-11-29

## 2022-11-29 RX ADMIN — Medication 400 MILLIGRAM(S): at 19:14

## 2022-11-29 RX ADMIN — Medication 400 MILLIGRAM(S): at 16:34

## 2022-11-29 RX ADMIN — LIDOCAINE 1 PATCH: 4 CREAM TOPICAL at 16:33

## 2022-11-29 RX ADMIN — Medication 650 MILLIGRAM(S): at 16:34

## 2022-11-29 NOTE — ED PROVIDER NOTE - CLINICAL SUMMARY MEDICAL DECISION MAKING FREE TEXT BOX
concern for possible scapilar/ rib fractures. Himanshu get imageing to eval for traumatic path. pain control and rreeval. tetanus up to date. Concern for possible scapular/ rib fractures. Will get imaging to eval for traumatic path. pain control and reeval. Tetanus up to date. Concern for possible scapular fracture or rib fractures. Will get imaging to eval for traumatic pathology. pain control and reeval. Tetanus up to date.

## 2022-11-29 NOTE — ED ADULT NURSE NOTE - CHIEF COMPLAINT QUOTE
pt fell, injuring his back on a metal guard rail and states a wheel [place] landed on him.  pt c/o right sided back pain, worse with inhalation and right hip pain.

## 2022-11-29 NOTE — ED PROVIDER NOTE - PATIENT PORTAL LINK FT
You can access the FollowMyHealth Patient Portal offered by [facility] by registering at the following website: [email or web address]. By joining [employer]’s FollowMyHealth portal, you will also be able to view your health information using other applications (apps) compatible with our system.

## 2022-11-29 NOTE — ED ADULT NURSE REASSESSMENT NOTE - NS ED NURSE REASSESS COMMENT FT1
assumed care for pt at 1930, charting as noted. PA at bedside. respirations even and unlabored. pt shows no s/s of acute distress at this time. safety precautions maintained.

## 2022-11-29 NOTE — ED PROVIDER NOTE - INTERNATIONAL TRAVEL
He can proceed with procedure.   He can HOLD Plavix 7 days prior to procedure; should ideally CONTINUE aspirin.  Resume Plavix after procedure.  NO need to antibiotics.  Thanks, AB   No

## 2022-11-29 NOTE — ED ADULT NURSE NOTE - OBJECTIVE STATEMENT
Patient was at work fell down a hole and landed backwards against guard rail, now having right shoulder/ back pain

## 2022-11-29 NOTE — ED ADULT NURSE NOTE - NS ED NOTE ABUSE SUSPICION NEGLECT YN
pt BIBA from NH for SOB x 1 day. as per EMS, lasix given at NH 60mg total. pt also lethargic, only responsive to painful stimuli. No

## 2022-11-29 NOTE — ED PROVIDER NOTE - NS ED ROS FT
REVIEW OF SYSTEMS:  General:  no fever, no chills, + diaphoretic  HEENT: no headache, no vision changes  Cardiac: no chest pain, no palpitations  Respiratory: no cough, no shortness of breath  Gastrointestinal: no abdominal pain, no nausea, no vomiting, no diarrhea, no melena, no hematochezia   Genitourinary: no hematuria, no dysuria, no urinary frequency, no urinary hesitancy, no vaginal bleeding or abnormal discharge  Extremities: no extremity swelling, no extremity pain  Neuro: no focal weakness, no numbness/tingling of the extremities, no decreased sensation  Heme: no easy bleeding, no easy bruising, no anemia  Skin: no abrasions, no jaundice, no pruritis, no rashes, no lesions  MSK: Diffused right sided pain in pelvis, back, and rib regions  -Gilda Beck MD Attending Physician REVIEW OF SYSTEMS:  General:  no fever, no chills, + diaphoretic  HEENT: no headache, no vision changes  Cardiac: +upper thoracic pain with inspiration  Respiratory: no cough, no shortness of breath  Gastrointestinal: no abdominal pain, no nausea, no vomiting, no diarrhea,   Neuro: no focal weakness, no numbness/tingling of the extremities, no decreased sensation  Skin: +abrasion to R back  MSK: Diffuse right sided pain in hip, back, and rib regions  -Gilda Beck MD Attending Physician

## 2022-11-29 NOTE — ED PROVIDER NOTE - PHYSICAL EXAMINATION
PHYSICAL EXAM:   General: Appears uncomfortable, appears stated age, not in extremis   HEENT: NC/AT, PERRLA, conjunctiva pink, airway patent  Cardiovascular: regular rate and rhythm, + S1/S2, no murmurs, rubs, gallops appreciated  Respiratory: clear to auscultation bilaterally, good aeration bilaterally, nonlabored respirations  Abdominal: soft, nontender, nondistended, no rebound, guarding or rigidity  Back: no costovertebral tenderness, no rashes noted  Extremities: no LE edema b/l. Radial pulses equal and strong b/l  Neuro: Alert and oriented x3. Moving all extremities. Ambulatory.  Psychiatric: appropriate mood and affect.   Skin: appropriate color, warm, dry.   MSK: abraision over riht thoraci cregon. TTP of tehe inferioe portion of scapula with some bulging w/ shouldrer ROM. Diffused TTP of shoulder w/lo gross defority or ecchymosis. Full rom of shoulder. No focal tenderness to rest of the arm. distally N/V intact. Midline thoracic lower midline ttp. Lower thoracic and upper lumbar parasinal ttp (paraspinal greater than midline). TTP at bilateral greater chocanters right greater than left q.o obviosu deformity or overlying lesions. Able toa mbulate Doog DO pulses sensations intact bilaterally.   -Gilda Beck MD Attending Physician PHYSICAL EXAM:   General: Appears uncomfortable, appears stated age, not in extremis   HEENT: NC/AT, PERRLA, conjunctiva pink, airway patent  Cardiovascular: regular rate and rhythm, + S1/S2, no murmurs, rubs, gallops appreciated  Respiratory: clear to auscultation bilaterally, good aeration bilaterally, nonlabored respirations  Abdominal: soft, nontender, nondistended, no rebound, guarding or rigidity  Back: no costovertebral tenderness, no rashes noted  Extremities: no LE edema b/l. Radial pulses equal and strong b/l  Neuro: Alert and oriented x3. Moving all extremities. Ambulatory.  Psychiatric: appropriate mood and affect.   Skin: appropriate color, warm, dry.   MSK: abrasion over right thoracic region. TTP of the inferior portion of scapula with some bulging. Diffused TTP of shoulder w/o gross deformity or ecchymosis. Full rom of shoulder. No focal tenderness to rest of the arm. distally N/V intact. Midline thoracic lower midline ttp. Lower thoracic and upper lumbar paraspinal ttp (paraspinal greater than midline). TTP at bilateral greater trochanter (right greater than left) w/o obvious deformity or overlying lesions. Able to ambulate. Good DO pulses sensations intact bilaterally.   -Gilda eBck MD Attending Physician PHYSICAL EXAM:   General: Appears uncomfortable, appears stated age, not in extremis   HEENT: NC/AT, no midline c spine tenderness, airway patent  Cardiovascular: regular rate and rhythm, + S1/S2, no murmurs, rubs, gallops appreciated  Respiratory: clear to auscultation bilaterally, good aeration bilaterally, nonlabored respirations  Abdominal: soft, nontender, nondistended, no rebound, guarding or rigidity  Neuro: Alert and oriented x3. Moving all extremities. Ambulatory.  Psychiatric: appropriate mood and affect.   Skin: sd noted below  MSK: liner superficial abrasion over right thoracic region. TTP of the inferior portion of scapula with some bulging with ROM of R arm. Diffuse TTP of shoulder w/o gross deformity or ecchymosis. Full rom of shoulder. No focal tenderness to rest of the arm. distally Neurovascularly intact. Midline lower thoracic ttp and midline upper lumbar spinal ttp (also with lumbar paraspinal TTP greater than the midlin tenderness). TTP at bilateral greater trochanter (right greater than left) w/o obvious deformity or overlying lesions. Able to ambulate. Good DP pulses b/l, sensation intact to light touch bilaterally.   -Gilda Beck MD Attending Physician

## 2022-11-29 NOTE — ED PROVIDER NOTE - WR ORDER ID 1
[id number]
Patient and/or family announced that they are leaving. They were advised to stay, advised to return if worse.

## 2022-11-29 NOTE — ED PROVIDER NOTE - OBJECTIVE STATEMENT
28 y/o male w/ hx of KLS on 0.5 mg of Lorazepam c/o 9/10 right sided back, rib, and pelvic pain worsening w/ inspiration s/p fall on morning of visit. Reports was carrying a wheel [place] full of dirt backwards when he fell w/ right leg into a three foot hole and fell on his right side into a guardrail post. Pt states he is sweating at time of visit despite feeling cold.  Additionally reports neck is stiff, dull pelvic pain, and notes that pain is worst in his back. Pt last tetanus shot was a year ago. 26 y/o male w/ hx of Klein-Vance syndorme on 0.5 mg of Lorazepam c/o 9/10 right sided back, R rib pain and R hip pain worsening w/ inspiration s/p fall on morning of visit. Reports was carrying a wheel [place] full of dirt backwards when he fell w/ right leg into a three foot hole and fell on his right side into a metal guardrail post. Pt states he is sweating at time of visit despite feeling cold.  Pt last tetanus shot was a year ago.

## 2022-11-29 NOTE — ED ADULT TRIAGE NOTE - CHIEF COMPLAINT QUOTE
pt fell, injuring his back on a metal guard rail and states a wheel [place] landed on him.  pt c/o right sided back pain, worse with inhalation and right hip pain.

## 2022-11-30 NOTE — ED POST DISCHARGE NOTE - REASON FOR FOLLOW-UP
Other shoulder XR shows downsloping of the acromion could result in impingement upon rotator cuff, lucent lesion within proximal right humeral shaft measuring 1.4cm. MRI recommended, LM, letter sent

## 2023-01-26 NOTE — ED STATDOCS - ATTESTATION, MLM
I have reviewed and confirmed nurses' notes for patient's medications, allergies, medical history, and surgical history. Complex Repair And Bilobe Flap Text: The defect edges were debeveled with a #15 scalpel blade.  The primary defect was closed partially with a complex linear closure.  Given the location of the remaining defect, shape of the defect and the proximity to free margins a bilobe flap was deemed most appropriate for complete closure of the defect.  Using a sterile surgical marker, an appropriate advancement flap was drawn incorporating the defect and placing the expected incisions within the relaxed skin tension lines where possible.    The area thus outlined was incised deep to adipose tissue with a #15 scalpel blade.  The skin margins were undermined to an appropriate distance in all directions utilizing iris scissors.

## 2023-03-23 ENCOUNTER — APPOINTMENT (OUTPATIENT)
Dept: ORTHOPEDIC SURGERY | Facility: CLINIC | Age: 28
End: 2023-03-23
Payer: OTHER MISCELLANEOUS

## 2023-03-23 VITALS — HEIGHT: 63 IN | BODY MASS INDEX: 39.87 KG/M2 | WEIGHT: 225 LBS

## 2023-03-23 DIAGNOSIS — M51.16 INTERVERTEBRAL DISC DISORDERS WITH RADICULOPATHY, LUMBAR REGION: ICD-10-CM

## 2023-03-23 PROCEDURE — 99204 OFFICE O/P NEW MOD 45 MIN: CPT

## 2023-03-23 PROCEDURE — 73503 X-RAY EXAM HIP UNI 4/> VIEWS: CPT

## 2023-03-23 NOTE — DISCUSSION/SUMMARY
[de-identified] : We had a thorough discussion regarding the nature of his pain, the pathophysiology, as well as all treatment options. Based on clinical exam, MRI and radiograph findings he has acetabular labrum tear of the right hip and lumbar disc herniation with radiculopathy. I have recommended him to follow up with a specialist for his lumbar spine. I have referred him for an injection of the right hip. He should follow up with me after the injection. All questions were answered and the patient verbalized understanding. The patient is in agreement with this treatment plan.

## 2023-03-23 NOTE — HISTORY OF PRESENT ILLNESS
[de-identified] : AYAKA CROSS is a 27 year male being seen for initial visit R hip pain. He reports injuring his R hip at work on 11/29/2022. He reports he suffered a trip and fall injury. He has been performing physical therapy for the past 3 months with no relief. Currently, he reports pain with most movements. He endorses pain in a "C" position over his hip. He has been seeing pain management, who referred him for an ultrasound guided intra-articular cortisone injection, which he is receiving in 1 week. Of note, he has several herniated discs in his back from his work injury. Additionally, he complains of increased frequency of urination.

## 2023-03-23 NOTE — REASON FOR VISIT
[Initial Visit] : an initial visit for [Workers' Comp: Date of Injury: _______] : This visit is related to worker's compensation. Date of Injury: [unfilled] [FreeTextEntry2] : R hip pain.

## 2023-03-23 NOTE — ADDENDUM
[FreeTextEntry1] : Documented by Cherie Alexandra acting as a scribe for Dr. Guan and Tyrone Clayton PA-C on 03/23/2023.   All medical record entries made by the Scribe were at my, Dr. Guan, and Tyrone Clayton's, direction and personally dictated by me on 03/23/2023. I have reviewed the chart and agree that the record accurately reflects my personal performance of the history, physical exam, procedure and imaging.

## 2023-03-23 NOTE — PHYSICAL EXAM
[de-identified] : Physical Exam: \par General: Well appearing, no acute distress \par Neurologic: A&Ox3, No focal deficits \par Head: NCAT without abrasions, lacerations, or ecchymosis to head, face, or scalp \par Eyes: No scleral icterus, no gross abnormalities \par Respiratory: Equal chest wall expansion bilaterally, no accessory muscle use \par Lymphatic: No lymphadenopathy palpated \par Skin: Warm and dry \par Psychiatric: Normal mood and affect \par \par Examination of the Right hip reveals no obvious deformity or leg length discrepancy. There is no swelling noted. The patient is nontender to palpation over the greater trochanter, groin, and IT band. The patients range of motion is to 110 degrees of hip flexion, 40 degrees of abduction, 20 degrees of adduction, 15 degrees of internal rotation and 35 degrees of external rotation. The patient has 5/5 strength to resisted hip flexion, abduction and adduction. The patient has a negative QUIRINO and positive FADIR Test. Positve Forbes Test. Positive resisted SLR test at 30 degrees of hip flexion ([facility]). Negative Piriformis Test. No SI joint instability. There is no pain with logrolling. The calf and thigh are soft and nontender bilaterally. The patient is grossly neurovascularly intact distally.  [de-identified] : DATE OF EXAM: 03/08/2023\par MRI-RIGHT HIP NON CONTRAST\par IMPRESSION:\par \par 1. Anterior labral tear.\par 2. No acute osseous injury.\par 3. No tendon tear.\par \par Bilateral AP, frog leg, Ramirez views and false profile views were performed today in the office. They demonstrate no fracture, no deformity, no dislocation, no bony lesions. \par \par DATE OF EXAM: 12/23/2022\par MRI-LUMBAR SPINE NON CONTRAST\par IMPRESSION:\par \par L5-S1: A disc bulge indenting upon the ventral epidural fat. Mild bilateral facet arthropathy. Mild right foraminal narrowing.\par \par Multilevel mild facet arthropathy.

## 2023-05-08 ENCOUNTER — APPOINTMENT (OUTPATIENT)
Dept: ORTHOPEDIC SURGERY | Facility: CLINIC | Age: 28
End: 2023-05-08
Payer: OTHER MISCELLANEOUS

## 2023-05-08 VITALS — HEIGHT: 63 IN | WEIGHT: 225 LBS | BODY MASS INDEX: 39.87 KG/M2

## 2023-05-08 PROCEDURE — 99214 OFFICE O/P EST MOD 30 MIN: CPT

## 2023-05-11 NOTE — DISCUSSION/SUMMARY
[de-identified] : We had a thorough discussion regarding the nature of his pain, the pathophysiology, as well as all treatment options. Patient should follow up with me after seeing his neurosurgeon. Patient's lack of range of motion during clinical assessment shows that he is a good candidate for surgical intervention. He has tried and failed all conservative treatment such as at home exercises, extensive physical therapy, cortisone injections and OTC NSAIDs.  All risks, benefits and alternatives to the proposed surgical procedure, left  hip arthroscopy, Femorplasty, Acetubloplasty, Labrum Repair, Capsule Repair, were discussed in great detail with the patient. Risks include but are not limited to pain, bleeding, infection, stiffness, medical complications (including DVT, PE, MI), and risks of anesthesia. The patient expressed understanding and all questions were answered. The patient is electing to proceed, and will have the patient scheduled accordingly. I provided him contact number of my surgical coordinator Ronnie, who will go over dates for this procedure. All questions were answered.

## 2023-05-11 NOTE — ADDENDUM
[FreeTextEntry1] : Documented by Cherie Alexandra acting as a scribe for Dr. Guan and Tyrone Clayton PA-C on 05/08/2023.   All medical record entries made by the Scribe were at my, Dr. Guan, and Tyrone Clayton's, direction and personally dictated by me on 05/08/2023. I have reviewed the chart and agree that the record accurately reflects my personal performance of the history, physical exam, procedure and imaging.

## 2023-05-11 NOTE — PHYSICAL EXAM
[de-identified] : Physical Exam: \par General: Well appearing, no acute distress \par Neurologic: A&Ox3, No focal deficits \par Head: NCAT without abrasions, lacerations, or ecchymosis to head, face, or scalp \par Eyes: No scleral icterus, no gross abnormalities \par Respiratory: Equal chest wall expansion bilaterally, no accessory muscle use \par Lymphatic: No lymphadenopathy palpated \par Skin: Warm and dry \par Psychiatric: Normal mood and affect \par \par Examination of the Right hip reveals no obvious deformity or leg length discrepancy. There is no swelling noted. The patient is nontender to palpation over the greater trochanter, groin, and IT band. The patients range of motion is to 110 degrees of hip flexion, 40 degrees of abduction, 20 degrees of adduction, 15 degrees of internal rotation and 35 degrees of external rotation. The patient has 5/5 strength to resisted hip flexion, abduction and adduction. The patient has a negative QUIRINO and positive FADIR Test. Positve Forbes Test. Positive resisted SLR test at 30 degrees of hip flexion ([facility]). Negative Piriformis Test. No SI joint instability. There is no pain with logrolling. The calf and thigh are soft and nontender bilaterally. The patient is grossly neurovascularly intact distally.  [de-identified] : DATE OF EXAM: 03/08/2023\par MRI-RIGHT HIP NON CONTRAST\par IMPRESSION:\par \par 1. Anterior labral tear.\par 2. No acute osseous injury.\par 3. No tendon tear.\par \par Bilateral AP, frog leg, Ramirez views and false profile views were performed on 3/23/23 and available for me to review in the office. They demonstrate no fracture, no deformity, no dislocation, no bony lesions. \par \par DATE OF EXAM: 12/23/2022\par MRI-LUMBAR SPINE NON CONTRAST\par IMPRESSION:\par \par L5-S1: A disc bulge indenting upon the ventral epidural fat. Mild bilateral facet arthropathy. Mild right foraminal narrowing.\par \par Multilevel mild facet arthropathy.

## 2023-05-11 NOTE — HISTORY OF PRESENT ILLNESS
[de-identified] : AYAKA CROSS is a 27 year male being seen for f/u visit R hip pain. He reports injuring his R hip at work on 11/29/2022. He notes the injection provided some relief for 1 week, which brought his pain levels down to 3-4 pain level. He had an EMG which showed lateral nerve damage. He is seeing a neurosurgeon next week. He has tried and failed all conservative treatment such as at home exercises, physical therapy, NSAIDs and cortisone injections. Patient is unsure of what else to do.

## 2023-06-14 ENCOUNTER — TRANSCRIPTION ENCOUNTER (OUTPATIENT)
Age: 28
End: 2023-06-14

## 2023-06-14 ENCOUNTER — OUTPATIENT (OUTPATIENT)
Dept: INPATIENT UNIT | Facility: HOSPITAL | Age: 28
LOS: 1 days | Discharge: HOME | End: 2023-06-14
Payer: COMMERCIAL

## 2023-06-14 ENCOUNTER — RESULT REVIEW (OUTPATIENT)
Age: 28
End: 2023-06-14

## 2023-06-14 ENCOUNTER — APPOINTMENT (OUTPATIENT)
Dept: ORTHOPEDIC SURGERY | Facility: HOSPITAL | Age: 28
End: 2023-06-14

## 2023-06-14 VITALS
RESPIRATION RATE: 17 BRPM | HEART RATE: 90 BPM | WEIGHT: 229.94 LBS | OXYGEN SATURATION: 98 % | TEMPERATURE: 98 F | DIASTOLIC BLOOD PRESSURE: 61 MMHG | HEIGHT: 63 IN | SYSTOLIC BLOOD PRESSURE: 103 MMHG

## 2023-06-14 VITALS
HEART RATE: 86 BPM | SYSTOLIC BLOOD PRESSURE: 114 MMHG | RESPIRATION RATE: 16 BRPM | DIASTOLIC BLOOD PRESSURE: 53 MMHG | OXYGEN SATURATION: 100 % | TEMPERATURE: 98 F

## 2023-06-14 DIAGNOSIS — S73.191A OTHER SPRAIN OF RIGHT HIP, INITIAL ENCOUNTER: ICD-10-CM

## 2023-06-14 DIAGNOSIS — S02.609A FRACTURE OF MANDIBLE, UNSPECIFIED, INITIAL ENCOUNTER FOR CLOSED FRACTURE: Chronic | ICD-10-CM

## 2023-06-14 DIAGNOSIS — Z98.89 OTHER SPECIFIED POSTPROCEDURAL STATES: Chronic | ICD-10-CM

## 2023-06-14 DIAGNOSIS — Z98.890 OTHER SPECIFIED POSTPROCEDURAL STATES: Chronic | ICD-10-CM

## 2023-06-14 PROCEDURE — 88304 TISSUE EXAM BY PATHOLOGIST: CPT

## 2023-06-14 PROCEDURE — C1713: CPT

## 2023-06-14 PROCEDURE — 29916 HIP ARTHRO W/LABRAL REPAIR: CPT | Mod: RT

## 2023-06-14 PROCEDURE — 29914 HIP ARTHRO W/FEMOROPLASTY: CPT | Mod: RT

## 2023-06-14 PROCEDURE — 88304 TISSUE EXAM BY PATHOLOGIST: CPT | Mod: 26

## 2023-06-14 PROCEDURE — 76000 FLUOROSCOPY <1 HR PHYS/QHP: CPT

## 2023-06-14 PROCEDURE — 29999 UNLISTED PX ARTHROSCOPY: CPT | Mod: RT

## 2023-06-14 RX ORDER — INDOMETHACIN 50 MG
1 CAPSULE ORAL
Qty: 42 | Refills: 0
Start: 2023-06-14 | End: 2023-06-27

## 2023-06-14 RX ORDER — OXYCODONE HYDROCHLORIDE 5 MG/1
10 TABLET ORAL ONCE
Refills: 0 | Status: DISCONTINUED | OUTPATIENT
Start: 2023-06-14 | End: 2023-06-14

## 2023-06-14 RX ORDER — OXYCODONE AND ACETAMINOPHEN 5; 325 MG/1; MG/1
1 TABLET ORAL
Qty: 20 | Refills: 0
Start: 2023-06-14 | End: 2023-06-20

## 2023-06-14 RX ORDER — DOCUSATE SODIUM 100 MG
1 CAPSULE ORAL
Qty: 30 | Refills: 0
Start: 2023-06-14 | End: 2023-07-13

## 2023-06-14 RX ORDER — ASPIRIN/CALCIUM CARB/MAGNESIUM 324 MG
1 TABLET ORAL
Qty: 60 | Refills: 0
Start: 2023-06-14 | End: 2023-07-13

## 2023-06-14 RX ORDER — SODIUM CHLORIDE 9 MG/ML
1000 INJECTION, SOLUTION INTRAVENOUS
Refills: 0 | Status: DISCONTINUED | OUTPATIENT
Start: 2023-06-14 | End: 2023-06-14

## 2023-06-14 RX ORDER — ONDANSETRON 8 MG/1
4 TABLET, FILM COATED ORAL ONCE
Refills: 0 | Status: DISCONTINUED | OUTPATIENT
Start: 2023-06-14 | End: 2023-06-14

## 2023-06-14 RX ORDER — FENTANYL CITRATE 50 UG/ML
50 INJECTION INTRAVENOUS
Refills: 0 | Status: DISCONTINUED | OUTPATIENT
Start: 2023-06-14 | End: 2023-06-14

## 2023-06-14 RX ADMIN — FENTANYL CITRATE 50 MICROGRAM(S): 50 INJECTION INTRAVENOUS at 16:45

## 2023-06-14 RX ADMIN — OXYCODONE HYDROCHLORIDE 10 MILLIGRAM(S): 5 TABLET ORAL at 16:45

## 2023-06-14 NOTE — ASU DISCHARGE PLAN (ADULT/PEDIATRIC) - PROVIDER TOKENS
"[facility] Physician Consult Note     1000 - Wed 26 April 2017   Outpatient Procedures / Endoscopy     Pt elected not to have sedation - evaluation discontinued       A Cintia GOLDSTEIN / 0 min   [place] Physician / [phone] (p)   ______________________________        PROBLEMS   ACUTE/ CHRONIC       -   -   - anemia   -   -   - 62F   - Full Code    - morhpine sulfate --> hallucinations-   -   -   - vit D deficient rickets   -   - anemia chronic kidney disease   -   -   - coughing   -   -   - cad -    - NSTEMI - '13    - KAPIL RCA 9/20/13 cath   -   -   - hx PSVT/ reentrant tachycardia  -    - s/p AVN ablation   -   -   - htn   -   -   -   -   - CKD5 (endstage) -    - s/p Renal Transplant '96 Dr Stevenson     - failed - HDx2/wk     - w/u for new transplant now   -   - hx R Breast CA    - s/p lumpectomy/ RTx '06   -   - gerd   -   -   -   -   - arthritis   -   -   - difficulty walking   -   -   -   -   -   -   -   -   -      MEDICATIONS  (NEW/CHANGES)       -   -   -   -   -   - folic acid Folvite 1   - cholecalciferoil vit D3 1000 bid   - CaCO3 1000 bid   - NaHCO3 650 tid   -   -   -   -   -   -   -   -   -   -   - aspirin 81   - amlodipine 5   - metoprolol Toprol-XL 50   -   -   -   -   -   -   -   -   -   -   -   -   - cyclosporine modified Neoral 25   - SMZ//80   - mycophenolate Cellcept 500 bid   -   -   -   - omeprazole/ Prilosec 40 bid   -   -   -   -   -   -   -   -   -   -   -   -   -   -   -   -   -                              __________________________________________________                APPENDICES -   - Keystrokes for convenience   - Key to symbols, abbreviations, and format conventions         Keystrokes for convenience  -   - from on-line summary article by Bernard Ruth-- + \"all-around computer geek\"    - Working with words instead of letters   - Moving the Cursor   - Selecting text   - Combine in sequence   - Editing   - Formatting   - Functions           Working " with words instead of letters -     - using L or R arrow  (<- / ->), Backspace (<--), or Delete keys to select single characters     - add Control key (option key for [employer] users) to apply to entire words or phrases          - C/<-   - Ctrl + L arrow  - move cursor to beginning of previous word   - C/->   - Ctrl + R arrow - move cursor to beginning of next word     - C/<--  - Ctrl + Backspace  - delete previous word   - C/delete  - Ctrl + Delete  - delete next word     - C/up   - Ctrl + up arrow  - select text from cursor to beginning current paragraph or text entry field   - C/dn   - Ctrl + dn arrow  - select text from cursor to end current paragraph or text entry field                 Moving the Cursor -     - Home   -   - beginning of current line   - End   -   - end of current line   - C/Home  - Ctrl + Home  - top of text entry field   - C/End   - Ctrl + End  - bottom of text entry field   - pg up   - Page Up  - up a frame   - pg dn   - Page Down  - down a frame                 Selecting text -      - ^/end   - shift + end   - select to end of current line   - ^/home  - shift + home  - select to beginning of current line     - C/Lclk  - ctrl* + L click   - select individual smartphrases/entries/lines from  list   - ^/Lclk   - shift + L click   - select all smartphrases/entries/lines from initial selection to next     - ^<-   - shift + L arrow   - select characters one at a time   - ^->   - shift + R arrow   - select characters one at a time     - ^up    - shift  + up arrow   - select line above   - ^dn   - shift + dn arrow   - select line below     - ^C<-  - shift + ctrl + L arrow  - select words   - ^C->   - shift + ctrl + R arrow  - select words     - ^up  - shift + up arrow   - select paragraph above cursor   - ^dn   - shift + dn arrow   - select paragraph below  cursor      - ^Home  - shift + Home   - select text between cursor and beginning of line   - ^End   - shift + End   - select text between cursor  "and end of line     - ^C/Home - shift + ctrl + Home   - select text between cursor and beginning of next text entry field -------  - ^C/End   - shift + ctrl + End   - select text between cursor and end of next text entry field     - ^pg dn   - shift + page down   - select frame of text below cursor   - ^pg up   - shift + page up   - select frame of text above cursor     - C/A  - Ctrl + A    - select all text               Combine in sequence -     - ^End      - select to end line   - ^dn     - select line below   - (not need \"delete\" first, just start typing)               Editing -     - C/C   - Ctrl + C*   - copy selected text   - C/Insert  - Ctrl + Insert   - copy selected text     - C/X  -Ctrl + X*    - cut/delete selected text   - ^Del  - shift + Delete   - cut/delete selected text     - C/V   - Ctrl + V*   - paste   - ^Insert   - shift + Insert   - paste     - C/Z   - Ctrl + Z*   - undo   - C/Y   - Ctrl + Y*   - redo     - C/Z   - Ctrl + Z*   - go back 1 step   - A/<--  - Alt + Backspace   - go back 1 step (same as Ctrl + Z)                Formatting -     - C/B  - Ctrl + B*   - Bold   - C/I  - Ctrl + I*    - Italics   - C/U  - Ctrl .+ U*  - Underline               Functions -     - C/F   - Ctrl + F    - Find   - F3   - F3    - Find Next   - ^F3  - shift + F3   - Find Previous     - C/O  - Ctrl + O   - Open   - C/S   - Ctrl + S   - Save   - C/N   - Ctrl + N   - New doc   - C/P   - Ctrl + P   - Print     - A   - Alt**    - activate application's menu bar   - A/F   - Alt + F    - open File menu   - A/E  - Alt + E    - open Edit menu   - A/V  - Alt + V    - open View menu                                     ______________________________            Key to symbols, abbreviations, and format conventions - [ordered by sense / meaning]     Symbol (Description)  Meaning / interpretation     #  (number/ hashtag)   - problem - summary which does not need editing if copied forward     -  (dash)   - data - s/sx (changes) " found/observed at time pt seen     --  (double dash/ hyphen)  - Assessment/ Interpretation/ Impression - of observations in context of problem -       - - improved, worsened no change   -->  (arrow)    - Plan - action to be done in response to assessment -       - - either that day either or after discharge       --->>  (double arrow)   - cont same plan (eg --->> lisinopril  =  Cont lisinopril )        ([place])   - degrees    [   ] (brackets)  - [author's (my own) comments/thoughts/additions/assumptions]      0'4 (apostrophe)   - instead of period - not stop highlighting entire line as a single unit - means 0.4           Abbrv'n    Abbreviation    - Time -   -    - date-time (month implied)  - (-2d)  - 2d ago   - d  - day(s)   - m - min(s)   - minute(s)   - h  - hr(s)   - hour(s)   - mo  - month(s)       - c  - with   - w /   - with     - s  - without   - w /o  - without     - x  - except     - p  - after   - a  - before     - c/w  - consistent with   - c/c  - compare/contrast - ie differentiate from       - sx  - symptomatic   - asx  - asymptomatic       - sx  - symptoms   - s/sx  - signs/symptoms       - dx  - diagnosis   - dz  - disease   - dz  - dizzy   - ez  - easy       - tx  - treatment (or transplant)   - Tx  - Transplant (eg. LRD RTx - Living Related Donor Renal Transplant)     - px  - prophylaxis   - pphx  - prophylaxis       - d/c  - discharge (eg from hospital or from eye)       - cx  - culture (or canceled)   - cx  - cancelled       - fx  - fracture   - rxn  - reaction   - fxn  - function       - yest  - yesterday - (or yday)   - silvia  - tomorrow       - nl  - normal   - abnl  - abnormal     - incr'd  -   - decr'd -     - hr (r)  - hyper-   - ho (o) - hypo-      - abx  - antibiotics       - cp  - chest pain   - sob  - shortness of breath       - dz/ lh - dizziness / lightheadedness   - HA - HeadAche (ha)   - H/N  - Head / Neck (h/n)       - f/c  - fevers/chills   - n/v  - nausea/vomiting       -  cp  - chest pain/ pressure   - sob  - shortness of breath       - eg  - for example (e.g.)   - ie  - in other words (i.e.)       - 2   - secondary to (caused by, because of)   - 2/2  - secondary to (caused by, because of)     - dtr  - daughter   - mo  - mother   - fa  - father     - *o/w  - otherwise   - usu - usual     - * note: capital L used for visual clarity/emphasis where otherwise lower case is strictly correct -   - - eg in generic names drugs Labetalol or mL milliLiters        - [Cap]* - Capital Letter  - not need capital - only capital for clarity/emphasis in presentation here   - *Ctrl  - Control   - **Alt - Alternate         - BMBx  - Bone Marrow Biopsy             Anatomic Relationships -       - Lat - lateral (Lateral)        - prox  - proximal       Laterality (side) -   - bLat  - biLateral  - both sides   - iLat  - ipsiLateral  - same side   - cLat  - contraLateral  - opposite side       - L*  - Left*   - R  - Right   - B  - Bilateral   - (maría)* - (any anatomical structure) + * (asterix) => Bilateral implied unless side / laterality specified    -- eg. Calf* => calf (B)    -- eg. Calf -  R .... L .... => different findings for R or L specified         - * note: capital L used for visual clarity/emphasis where otherwise lower case is strictly correct -   - - eg in generic names drugs Labetalol or mL milliLiters       - eg -     - UE  - Upper Extremity   - LE - Lower Extremity     - LUE   - Left Upper Extremity   - LLE  - Right Lower Extremity     - RUE  - Right Upper Extremity   - RLE  - Right Lower Extremity     - BU/LE  - Bilat Upper/Lower Extremities       __________________________________________         PROVIDER:[TOKEN:[[id number]]]

## 2023-06-14 NOTE — ASU DISCHARGE PLAN (ADULT/PEDIATRIC) - CARE PROVIDER_API CALL
Daniel Guan  Orthopaedic Surgery  [address], [address]  [place] [address]  Phone: [phone]  Fax: [phone]  Follow Up Time:

## 2023-06-14 NOTE — ASU PREOP CHECKLIST - DNR CLARIFICATION FORM COMPLETED
[facility]  [phone]    ADD PROVIDER'S REPORT:    Date: 10/28/2019   Patient: Suresh Fregoso    Med Record #:[id number]  Parents: Ascencion and Dorothy    Allergies:  ALLERGIES:  No Known Allergies    Medications  Current Outpatient Medications   Medication Sig Dispense Refill   • methylpheniDATE (RITALIN LA) 20 MG 24 hr capsule Take 1 capsule by mouth every morning. Do not start before October 8, 2019. 30 capsule 0     No current facility-administered medications for this visit.        Suresh is a 11 year old  male here for ADD/ADHD follow up.  He is accompanied by:father    OTHER REPORTS:   Previous ADD/ADHD visit notes were reviewed.    SUBJECTIVE  HPI:   Age at diagnosis: 9 years  CONCERNS: none at this time. He continues to do well at home and school. Homework has improved. No associated problems with hyperactivity, impulsivity, mood changes, or peer or parental conflicts.  Stimulant medication is helping with attention.   He reports occasional bilateral chest pain, lasts one to two minutes and occurs every few weeks. He has not previously told parents or teachers of pain. He denies any other symptoms when it occurs and could not describe if deep or superficial.  CONTEXT: no identified stressors  MODIFYING FACTORS: no extra services  STATUS: without change  Medication adherence:   Patient reports adherence to dosing schedules Yes, occasional missed dose  Side effects of medication: decreased appetite    SCHOOL:  Suresh is in 5th Grade at [employer].  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    Sleep: no problems  Behavior at home: typical age appropriate  difficulties and there is sib fighting  Psychologic problems: no problems  In Counseling/Tutoring: psychologist - Margaux Lindsey - quarterly, every 2-3 months  Mood  Depression: no problems    Anxiety: worries about others dying - improving     Current Problem List:  Patient Active Problem List   Diagnosis   • Attention deficit hyperactivity disorder (ADHD), combined type   • Bronchospasm   • Childhood behavior problems   • Impacted cerumen   • Loss of appetite   • Preauricular tag   • Sibling relationship problem       PMH  Reviewed without changes      REVIEW OF SYSTEMS  Review of Systems   Constitutional: Positive for appetite change (decreased). Negative for activity change and fatigue.   Respiratory: Negative for shortness of breath.    Cardiovascular: Positive for chest pain (occasional brief pain). Negative for palpitations.   Gastrointestinal: Negative for abdominal pain, constipation and diarrhea.   Neurological: Positive for headaches (occasional). Negative for dizziness and light-headedness.       PHYSICAL EXAMINATION:  Visit Vitals  /60   Pulse 90   Ht 4' 8.25\" (1.429 m)   Wt 31.8 kg (70 lb)   BMI 15.55 kg/m²     Wt Readings from Last 4 Encounters:   08/13/19 32.4 kg (71 lb 6.4 oz) (40 %, Z= -0.26)*   06/20/19 29.7 kg (65 lb 6.4 oz) (25 %, Z= -0.69)*   05/14/19 28.6 kg (63 lb) (20 %, Z= -0.86)*   02/23/19 29 kg (64 lb) (27 %, Z= -0.60)*     * Growth percentiles are based on [facility] (Boys, 2-20 Years) data.       Physical Exam   Constitutional: He appears well-developed and well-nourished. He is active.   HENT:   Mouth/Throat: Mucous membranes are moist. Oropharynx is clear.   Eyes: Pupils are equal, round, and reactive to light. Conjunctivae and EOM are normal.   Cardiovascular: Normal rate and regular rhythm.   No murmur heard.  Pulmonary/Chest: Effort normal and breath sounds normal.   Neurological: He is alert.   Psychiatric: He has a normal mood and affect. His behavior is normal.        ASSESSMENT/SUMMARY PLAN  1. ADHD (attention deficit hyperactivity disorder) - combined type - stable    PLAN: continue present medication and dosing schedule, make a to-do list/chart and reward accomplishing tasks    2. LOSS OF APPETITE - improving    PLAN:  protein bars or shakes  and encouraged to eat regularly, small frequent meals  3. Intermittent chest pain - new problem    PLAN:  monitor, encouraged to report to teacher and parents when occurs to better assess. If persisting or worsening, stop medication and call, consider EKG.      Medication Concerns: weight loss or loss of appetite  Medication Changes:  continue present medication  Followup: ADHD FOLLOW-UP IN 3 months  Call: if having problems or concerns     20 minutes spent face to face with the patient, with more than 50% of that time spent in counseling about ADD progress and symptom management.    Signed: Janet Wing CNP         n/a

## 2023-06-14 NOTE — ASU PATIENT PROFILE, ADULT - NSICDXPASTSURGICALHX_GEN_ALL_CORE_FT
PAST SURGICAL HISTORY:  H/O elbow surgery right 2009    H/O elbow surgery 2012 OATS procedure    H/O left knee surgery 2012    History of appendectomy     History of arthroscopy of right shoulder 3/2023 torn labrum    Jaw fracture surgery  2014

## 2023-06-14 NOTE — ASU DISCHARGE PLAN (ADULT/PEDIATRIC) - NS MD DC FALL RISK RISK
For information on Fall & Injury Prevention, visit: [email or web address] OR  [email or web address] OR  https://www.cdc.gov/steadi/patient.html

## 2023-06-14 NOTE — ASU PATIENT PROFILE, ADULT - NSICDXPASTMEDICALHX_GEN_ALL_CORE_FT
PAST MEDICAL HISTORY:  Syndrome Kleine-morin syndrome     PAST MEDICAL HISTORY:  Lower back pain     Syndrome Kleine-morin syndrome

## 2023-06-14 NOTE — ASU PATIENT PROFILE, ADULT - FALL HARM RISK - UNIVERSAL INTERVENTIONS
Bed in lowest position, wheels locked, appropriate side rails in place/Call bell, personal items and telephone in reach/Instruct patient to call for assistance before getting out of bed or chair/Non-slip footwear when patient is out of bed/[place] to call system/Physically safe environment - no spills, clutter or unnecessary equipment/Purposeful Proactive Rounding/Room/bathroom lighting operational, light cord in reach

## 2023-06-15 RX ORDER — OXYCODONE 5 MG/1
5 TABLET ORAL
Qty: 20 | Refills: 0 | Status: ACTIVE | COMMUNITY
Start: 2023-06-15 | End: 1900-01-01

## 2023-06-15 RX ORDER — CYCLOBENZAPRINE HYDROCHLORIDE 10 MG/1
10 TABLET, FILM COATED ORAL 3 TIMES DAILY
Qty: 21 | Refills: 0 | Status: ACTIVE | COMMUNITY
Start: 2023-06-15 | End: 1900-01-01

## 2023-06-15 RX ORDER — ONDANSETRON 4 MG/1
4 TABLET ORAL
Qty: 42 | Refills: 0 | Status: COMPLETED | COMMUNITY
Start: 2023-06-15 | End: 2023-06-22

## 2023-06-18 DIAGNOSIS — M25.851 OTHER SPECIFIED JOINT DISORDERS, RIGHT HIP: ICD-10-CM

## 2023-06-18 DIAGNOSIS — G47.13 RECURRENT HYPERSOMNIA: ICD-10-CM

## 2023-06-18 DIAGNOSIS — M24.151 OTHER ARTICULAR CARTILAGE DISORDERS, RIGHT HIP: ICD-10-CM

## 2023-06-19 LAB — SURGICAL PATHOLOGY STUDY: SIGNIFICANT CHANGE UP

## 2023-06-20 ENCOUNTER — APPOINTMENT (OUTPATIENT)
Dept: ORTHOPEDIC SURGERY | Facility: CLINIC | Age: 28
End: 2023-06-20
Payer: OTHER MISCELLANEOUS

## 2023-06-20 PROCEDURE — 99024 POSTOP FOLLOW-UP VISIT: CPT

## 2023-06-20 PROCEDURE — 73502 X-RAY EXAM HIP UNI 2-3 VIEWS: CPT

## 2023-07-04 NOTE — HISTORY OF PRESENT ILLNESS
[Doing Well] : is doing well [No Sign of Infection] : is showing no signs of infection [de-identified] : AYAKA is a 28 year male here today for his hip and has a brace on his hip. He note he gets winded standing and sometimes he gets dizzy. He notes he starts sweating even when he is not doing anything.He notes he is hydrating himself well. He notes he is starting PT Thursday. [de-identified] : incision looks intact patient can take a shower now but cant scrub it as of right now. Incision will be completely healed after 3-4 weeks. [de-identified] : 2 views of the right hip were performed today available for me to review.  They demonstrate adequate resection of cam lesion with no signs of fracture over resection or incomplete resection [de-identified] : Brandon is a 28-year-old male status post right hip arthroscopy with labral repair femoroplasty acetabuloplasty and capsular repair.  Overall he is doing well.  He will continue physical therapy.  He will follow-up me in 4 weeks clinic reassessment.  All questions were answered agrees with plan.

## 2023-07-05 PROBLEM — M54.50 LOW BACK PAIN, UNSPECIFIED: Chronic | Status: ACTIVE | Noted: 2023-06-14

## 2023-07-06 ENCOUNTER — APPOINTMENT (OUTPATIENT)
Dept: ORTHOPEDIC SURGERY | Facility: CLINIC | Age: 28
End: 2023-07-06
Payer: OTHER MISCELLANEOUS

## 2023-07-06 VITALS
BODY MASS INDEX: 39.87 KG/M2 | DIASTOLIC BLOOD PRESSURE: 75 MMHG | SYSTOLIC BLOOD PRESSURE: 117 MMHG | WEIGHT: 225 LBS | HEART RATE: 97 BPM | HEIGHT: 63 IN

## 2023-07-06 PROCEDURE — 99024 POSTOP FOLLOW-UP VISIT: CPT

## 2023-07-06 NOTE — END OF VISIT
[FreeTextEntry3] : Documented by Jesica Teresa  acting as a scribe for Dr. Guan on 07/06/2023.\par \par All medical record entries made by the Scribe were at my, Dr. Guan, direction and personally dictated by me on 07/06/2023. I have reviewed the chart and agree that the record accurately reflects my personal performance of the history, physical exam, procedure and imaging.\par

## 2023-07-06 NOTE — HISTORY OF PRESENT ILLNESS
[___ Weeks Post Op] : [unfilled] weeks post op [Doing Well] : is doing well [No Sign of Infection] : is showing no signs of infection [de-identified] : spo s/p R hip arthroscopy, femoroplasty, acetabuloplasty, labral repair, capsule repair. DOS: 6/14/2023\par  [de-identified] : AYAKA CROSS is a 28 year male being seen for post op right hip arthroscopy, acetabulo. C/o some snapping and pulling at the hip which was painful. Became nauseas. First time started at PT and has happened 3-4x since. Happens randomly. Continues to use crutches, uses one in the house and 2 outside.  [de-identified] : incision looks intact patient can take a shower now but cant scrub it as of right now. Incision will be completely healed after 3-4 weeks.\par \par 90 degrees flexion extension \par no impingement signs \par tender on hip flexor \par full ROM without any crepitus. No clunking. \par  [de-identified] : No new imaging performed today. [de-identified] : Brandon is a 28-year-old male status post right hip arthroscopy with labral repair femoroplasty acetabuloplasty and capsular repair.  Overall he is doing well.  He will continue physical therapy. Recommended ice at night. Continue taking mobic everyday. 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 He will follow-up me in 4-6 weeks clinic reassessment.  All questions were answered agrees with plan.

## 2023-07-17 ENCOUNTER — APPOINTMENT (OUTPATIENT)
Dept: ORTHOPEDIC SURGERY | Facility: CLINIC | Age: 28
End: 2023-07-17

## 2023-07-25 RX ORDER — METHYLPREDNISOLONE 4 MG/1
4 TABLET ORAL
Qty: 1 | Refills: 0 | Status: ACTIVE | COMMUNITY
Start: 2023-07-25 | End: 1900-01-01

## 2023-07-25 RX ORDER — MELOXICAM 15 MG/1
15 TABLET ORAL
Qty: 21 | Refills: 0 | Status: ACTIVE | COMMUNITY
Start: 2023-07-25 | End: 1900-01-01

## 2023-08-10 ENCOUNTER — APPOINTMENT (OUTPATIENT)
Dept: ORTHOPEDIC SURGERY | Facility: CLINIC | Age: 28
End: 2023-08-10
Payer: OTHER MISCELLANEOUS

## 2023-08-10 VITALS — HEIGHT: 63 IN | WEIGHT: 225 LBS | BODY MASS INDEX: 39.87 KG/M2

## 2023-08-10 PROCEDURE — 99024 POSTOP FOLLOW-UP VISIT: CPT

## 2023-08-10 NOTE — HISTORY OF PRESENT ILLNESS
[Doing Well] : is doing well [No Sign of Infection] : is showing no signs of infection [___ Months Post Op] : [unfilled] months post op [Adequate Pain Control] : has adequate pain control [de-identified] : spo s/p R hip arthroscopy, femoroplasty, acetabuloplasty, labral repair, capsule repair. DOS: 6/14/2023\par   [de-identified] : AYAKA CROSS is a 28 year male being seen for post op right hip arthroscopy, acetabulo. C/o fatigue when driving. He notes that the pinching, spasming pain he had is gone. He has not been to PT in two weeks because it has not been approved. He requests a G2 form to return to PT. [de-identified] : Incision sites are clean dry and intact. No surrounding erythema. No drainage. Range of motion 110 degrees of hip flexion. 15 degrees of IR with tightness. No pain on internal and external rotation or logrolling. He can perform a straight leg raise. Motor and sensation are intact distally. He does not have numbness over the pudendal area. The surgical incision site(s) was clean, dry and intact. Additional findings included an unremarkable neurological exam and peripheral vascular exam normal. [de-identified] : No new imaging performed today. [de-identified] : I had a discussion with the patient regarding the operative and postoperative course. The patient is doing well. He should continue with physical therapy once it is approved. Patient will follow up in 3 months for repeat clinical assessment. All questions were answered and the patient verbalized understanding. The patient is in agreement with this treatment plan.

## 2023-08-10 NOTE — ED ADULT NURSE NOTE - PRIMARY CARE PROVIDER
Cohn Calcipotriene Pregnancy And Lactation Text: The use of this medication during pregnancy or lactation is not recommended as there is insufficient data.

## 2023-08-10 NOTE — ADDENDUM
[FreeTextEntry1] : Documented by Georgette Tang acting as a scribe for Dr. Guan on 08/10/2023. All medical record entries made by the Scribe were at my, Dr. Guan's, direction and personally dictated by me on 08/10/2023. I have reviewed the chart and agree that the record accurately reflects my personal performance of the history, physical exam, procedure and imaging.

## 2023-09-14 ENCOUNTER — APPOINTMENT (OUTPATIENT)
Dept: ORTHOPEDIC SURGERY | Facility: CLINIC | Age: 28
End: 2023-09-14
Payer: OTHER MISCELLANEOUS

## 2023-09-14 PROCEDURE — 99213 OFFICE O/P EST LOW 20 MIN: CPT

## 2023-09-22 ENCOUNTER — OFFICE (OUTPATIENT)
Dept: URBAN - METROPOLITAN AREA CLINIC 115 | Facility: CLINIC | Age: 28
Setting detail: OPHTHALMOLOGY
End: 2023-09-22
Payer: COMMERCIAL

## 2023-09-22 DIAGNOSIS — H01.005: ICD-10-CM

## 2023-09-22 DIAGNOSIS — H01.002: ICD-10-CM

## 2023-09-22 PROCEDURE — 92004 COMPRE OPH EXAM NEW PT 1/>: CPT | Performed by: OPHTHALMOLOGY

## 2023-09-22 ASSESSMENT — SPHEQUIV_DERIVED
OD_SPHEQUIV: -1
OS_SPHEQUIV: -1.25

## 2023-09-22 ASSESSMENT — REFRACTION_AUTOREFRACTION
OS_CYLINDER: -0.50
OS_AXIS: 024
OS_SPHERE: -1.00
OD_AXIS: 026
OD_SPHERE: -0.75
OD_CYLINDER: -0.50

## 2023-09-22 ASSESSMENT — LID EXAM ASSESSMENTS
OS_BLEPHARITIS: LLL T
OD_BLEPHARITIS: RLL T

## 2023-09-22 ASSESSMENT — VISUAL ACUITY
OS_BCVA: 20/20
OD_BCVA: 20/20

## 2023-09-22 ASSESSMENT — TONOMETRY
OD_IOP_MMHG: 18
OS_IOP_MMHG: 18

## 2023-09-22 ASSESSMENT — CONFRONTATIONAL VISUAL FIELD TEST (CVF)
OS_FINDINGS: FULL
OD_FINDINGS: FULL

## 2023-10-26 ENCOUNTER — APPOINTMENT (OUTPATIENT)
Dept: ORTHOPEDIC SURGERY | Facility: CLINIC | Age: 28
End: 2023-10-26
Payer: OTHER MISCELLANEOUS

## 2023-10-26 VITALS — HEIGHT: 63 IN | BODY MASS INDEX: 39.87 KG/M2 | WEIGHT: 225 LBS

## 2023-10-26 PROCEDURE — 99214 OFFICE O/P EST MOD 30 MIN: CPT

## 2023-11-21 NOTE — ED PROVIDER NOTE - DURATION
Subjective   Patient ID: Chantelle Rao is a 69 y.o. female who presents for Follow-up (Chantelle is here for a follow up. She did go in Urgent care due to having trouble breathing. Did get a breathing treatment, still has a cough. Has been having migraines, that just started back up since last visit.  Declined weight).  Patient presents for 2 months follow up    Does have continued anxiety, but improving now that she moved.   Insomnia - continues to take ambien   Xanax prn    Headaches - dizziness and headaches, pressure sensation  Bilateral TMS bulging, red  Start antihistamine meclzine, see new meds ordered.         Vitals:    11/21/23 1307   BP: 120/80   Pulse: 92   Temp: 36.3 °C (97.4 °F)   SpO2: 94%       Review of Systems    Objective   Physical Exam    Assessment/Plan   Problem List Items Addressed This Visit       Insomnia    Relevant Medications    zolpidem (Ambien) 5 mg tablet    RLS (restless legs syndrome)    Relevant Medications    rOPINIRole (Requip) 2 mg tablet    SOB (shortness of breath) on exertion    Relevant Medications    albuterol (Ventolin HFA) 90 mcg/actuation inhaler    umeclidinium (Incruse Ellipta) 62.5 mcg/actuation inhalation     Other Visit Diagnoses       Dysfunction of both eustachian tubes    -  Primary    Relevant Medications    meclizine (Antivert) 12.5 mg tablet    fluticasone (Flonase) 50 mcg/actuation nasal spray    hydrocortisone-acetic acid (Vosol-HC) otic solution    Chronic obstructive pulmonary disease, unspecified COPD type (CMS/HCC)        Relevant Medications    benzonatate (Tessalon) 200 mg capsule    albuterol (Ventolin HFA) 90 mcg/actuation inhaler    umeclidinium (Incruse Ellipta) 62.5 mcg/actuation inhalation    Vertigo        Relevant Medications    meclizine (Antivert) 12.5 mg tablet                 Thank you for coming in today, please call my office if you have any concerns or questions.     Antonio STACK, CNP  
today

## 2023-11-30 ENCOUNTER — APPOINTMENT (OUTPATIENT)
Dept: ORTHOPEDIC SURGERY | Facility: CLINIC | Age: 28
End: 2023-11-30
Payer: OTHER MISCELLANEOUS

## 2023-11-30 PROCEDURE — 73564 X-RAY EXAM KNEE 4 OR MORE: CPT | Mod: LT

## 2023-11-30 PROCEDURE — 99214 OFFICE O/P EST MOD 30 MIN: CPT

## 2023-12-07 ENCOUNTER — APPOINTMENT (OUTPATIENT)
Dept: ORTHOPEDIC SURGERY | Facility: CLINIC | Age: 28
End: 2023-12-07

## 2023-12-21 ENCOUNTER — APPOINTMENT (OUTPATIENT)
Dept: ORTHOPEDIC SURGERY | Facility: CLINIC | Age: 28
End: 2023-12-21
Payer: OTHER MISCELLANEOUS

## 2023-12-21 VITALS — BODY MASS INDEX: 39.87 KG/M2 | HEIGHT: 63 IN | WEIGHT: 225 LBS

## 2023-12-21 PROCEDURE — 99204 OFFICE O/P NEW MOD 45 MIN: CPT

## 2023-12-26 RX ORDER — HYALURONATE SODIUM 20 MG/2 ML
20 SYRINGE (ML) INTRAARTICULAR
Qty: 1 | Refills: 0 | Status: ACTIVE | COMMUNITY
Start: 2023-12-21

## 2024-01-01 NOTE — PROCEDURAL SAFETY CHECKLIST WITH OR WITHOUT SEDATION - NSPREANESCONSENT_GEN_ALL_CORE
Present, accurate, and signed
Screen#: [id number]  Screen Date: 2024  Screen Comment: N/A    Screen#: [id number]  Screen Date: 2024  Screen Comment: CCHD done on 1/30@1005 both right hand and right foot O2 were 100% on room air

## 2024-01-08 ENCOUNTER — APPOINTMENT (OUTPATIENT)
Dept: ORTHOPEDIC SURGERY | Facility: CLINIC | Age: 29
End: 2024-01-08

## 2024-01-22 NOTE — REASON FOR VISIT
[Follow-Up Visit] : a follow-up visit for [Workers' Comp: Date of Injury: _______] : This visit is related to worker's compensation. Date of Injury: [unfilled] [Aftercare Following Surgery] : aftercare following surgery [FreeTextEntry2] : Left knee

## 2024-01-22 NOTE — HISTORY OF PRESENT ILLNESS
[de-identified] : Patient is a 28-year-old male here today for left knee pain. He reports that he notes swelling and difficulty with movement. He is here today to review his MRI results.

## 2024-01-22 NOTE — DISCUSSION/SUMMARY
[de-identified] : We had a thorough discussion regarding the nature of his pain, the pathophysiology, as well as all treatment options. Based on clinical exam, MRI and radiograph findings they have edema in the left knee with mild osteoarthritis and effusion. Patient was given prescription of formal physical therapy that he will perform 2x/wk for 6-8 wks. I am giving the patient a lateral stabilizing brace today in the office. All questions were answered and the patient verbalized understanding. The patient is in agreement with this treatment plan. He should focus on his upcoming spinal surgery at this time. The patient should follow up in 2-3 months.

## 2024-01-22 NOTE — PHYSICAL EXAM
[de-identified] : Physical Exam:  General: Well appearing, no acute distress  Neurologic: A&Ox3, No focal deficits  Head: NCAT without abrasions, lacerations, or ecchymosis to head, face, or scalp  Eyes: No scleral icterus, no gross abnormalities  Respiratory: Equal chest wall expansion bilaterally, no accessory muscle use  Lymphatic: No lymphadenopathy palpated  Skin: Warm and dry  Psychiatric: Normal mood and affect  Examination of the Left knee reveals no significant effusion, erythema, or ecchymosis. There are no leg length discrepancies appreciated. The patient's ROM is from 0-130 degrees. Increased mobility of the patella. Positive crepitus and grind. The knee is stable to Lachman testing, as well as anterior and posterior drawer. There is no valgus or varus instability. The calf and thigh are soft, supple, and nontender. The patient is grossly neurovascularly intact distally. [de-identified] : Left knee MRI RodríguezFredy  WYMAN: 12/5/23  IMPRESSION: 1. Edema in the superolateral aspect of Hoffa's fat pad compatible with Hoffa's fat pad impingement which can be seen in setting of patellofemoral maltracking.  2. Mild to moderate knee joint effusion.  3. Mild osteoarthritis predominantly involving the anterior compartment.  4. Intact medial and lateral meniscus.

## 2024-01-22 NOTE — ADDENDUM
[FreeTextEntry1] : Documented by Georgette Tang acting as a scribe for Dr. Guan on 12/21/2023. All medical record entries made by the Scribe were at my, Dr. Guan's, direction and personally dictated by me on 12/21/2023. I have reviewed the chart and agree that the record accurately reflects my personal performance of the history, physical exam, procedure and imaging.

## 2024-01-22 NOTE — PHYSICAL EXAM
[de-identified] : Physical Exam:  General: Well appearing, no acute distress  Neurologic: A&Ox3, No focal deficits  Head: NCAT without abrasions, lacerations, or ecchymosis to head, face, or scalp  Eyes: No scleral icterus, no gross abnormalities  Respiratory: Equal chest wall expansion bilaterally, no accessory muscle use  Lymphatic: No lymphadenopathy palpated  Skin: Warm and dry  Psychiatric: Normal mood and affect  Examination of the Left knee reveals no significant effusion, erythema, or ecchymosis. There are no leg length discrepancies appreciated. The patient's ROM is from 0-130 degrees. Increased mobility of the patella. Positive crepitus and grind. The knee is stable to Lachman testing, as well as anterior and posterior drawer. There is no valgus or varus instability. The calf and thigh are soft, supple, and nontender. The patient is grossly neurovascularly intact distally. [de-identified] : Left knee MRI RodríguezFredy  WYMAN: 12/5/23  IMPRESSION: 1. Edema in the superolateral aspect of Hoffa's fat pad compatible with Hoffa's fat pad impingement which can be seen in setting of patellofemoral maltracking.  2. Mild to moderate knee joint effusion.  3. Mild osteoarthritis predominantly involving the anterior compartment.  4. Intact medial and lateral meniscus.

## 2024-01-22 NOTE — HISTORY OF PRESENT ILLNESS
[de-identified] : Patient is a 28-year-old male here today for left knee pain. He reports that he notes swelling and difficulty with movement. He is here today to review his MRI results.

## 2024-01-22 NOTE — DISCUSSION/SUMMARY
[de-identified] : We had a thorough discussion regarding the nature of his pain, the pathophysiology, as well as all treatment options. Based on clinical exam, MRI and radiograph findings they have edema in the left knee with mild osteoarthritis and effusion. Patient was given prescription of formal physical therapy that he will perform 2x/wk for 6-8 wks. I am giving the patient a lateral stabilizing brace today in the office. All questions were answered and the patient verbalized understanding. The patient is in agreement with this treatment plan. He should focus on his upcoming spinal surgery at this time. The patient should follow up in 2-3 months.

## 2024-01-23 ENCOUNTER — NON-APPOINTMENT (OUTPATIENT)
Age: 29
End: 2024-01-23

## 2024-01-23 ENCOUNTER — APPOINTMENT (OUTPATIENT)
Dept: ORTHOPEDIC SURGERY | Facility: CLINIC | Age: 29
End: 2024-01-23
Payer: OTHER MISCELLANEOUS

## 2024-01-23 PROCEDURE — 20611 DRAIN/INJ JOINT/BURSA W/US: CPT | Mod: LT

## 2024-01-23 NOTE — DISCUSSION/SUMMARY
[de-identified] : I had a lengthy discussion with the patient regarding their condition.  Postinjection instructions were given.  The patient will monitor for signs of infection and can apply cryotherapy or moist heat.  The patient will follow-up next week for their next injection. They will call us with any issues or concerns.

## 2024-01-23 NOTE — PHYSICAL EXAM
[de-identified] : The Left knee is examined and reveals no swelling, effusion,  ecchymosis or erythema. Calf and thigh is soft and nontender. 2+PT pulses. Sensation is intact distally.

## 2024-01-23 NOTE — HISTORY OF PRESENT ILLNESS
[de-identified] : The patient presents today for his first left knee Euflexxa injection.  He notes no change in pain.  He continues to complain of pain diffusely around the knee.

## 2024-01-23 NOTE — PROCEDURE
[de-identified] : Injection: US guided Left knee joint. Indication: Osteoarthritis.  A discussion was had with the patient regarding this procedure and all questions were answered. All risks, benefits and alternatives were discussed. These included but were not limited to bleeding, infection, and allergic reaction. Alcohol was used to clean the skin, and chlorhexidine was used to sterilize and prep the area in the supero-lateral aspect of the Left knee. Ethyl chloride spray was then used as a topical anesthetic. A 22-gauge needle was used to inject 2 cc of Euflexxa into the Left knee. Ultrasound guidance was used for adequate placement of needle. A sterile bandage was then applied. The patient tolerated the procedure well and there were no complications. Post injection instructions were given.

## 2024-01-30 ENCOUNTER — APPOINTMENT (OUTPATIENT)
Dept: ORTHOPEDIC SURGERY | Facility: CLINIC | Age: 29
End: 2024-01-30
Payer: OTHER MISCELLANEOUS

## 2024-01-30 PROCEDURE — 20611 DRAIN/INJ JOINT/BURSA W/US: CPT | Mod: LT

## 2024-01-30 NOTE — HISTORY OF PRESENT ILLNESS
[de-identified] : Patient is a 28-year-old male here today for his second injection of euflexxa injection #2 to his left knee.  He notes no improvement of his symptoms yet.  He denies buckling or instability or any fevers or chills.

## 2024-01-30 NOTE — PROCEDURE
[de-identified] : Injection: US guided Left knee joint. Indication: Osteoarthritis.  A discussion was had with the patient regarding this procedure and all questions were answered. All risks, benefits and alternatives were discussed. These included but were not limited to bleeding, infection, and allergic reaction. Alcohol was used to clean the skin, and chlorhexidine was used to sterilize and prep the area in the supero-lateral aspect of the Left knee. Ethyl chloride spray was then used as a topical anesthetic. A 22-gauge needle was used to inject 2 cc of Euflexxa into the Left knee. Ultrasound guidance was used for adequate placement of needle. A sterile bandage was then applied. The patient tolerated the procedure well and there were no complications. Post injection instructions were given.

## 2024-01-30 NOTE — DISCUSSION/SUMMARY
[de-identified] : I had a lengthy discussion with the patient regarding their condition.  Postinjection instructions were given.  The patient will monitor for signs of infection and can apply cryotherapy or moist heat.  The patient will follow-up next week for their next injection. They will call us with any issues or concerns.

## 2024-01-30 NOTE — PHYSICAL EXAM
[de-identified] : The Left knee is examined and reveals no swelling, effusion,  ecchymosis or erythema. Calf and thigh is soft and nontender. 2+PT pulses. Sensation is intact distally.

## 2024-02-06 ENCOUNTER — APPOINTMENT (OUTPATIENT)
Dept: ORTHOPEDIC SURGERY | Facility: CLINIC | Age: 29
End: 2024-02-06
Payer: OTHER MISCELLANEOUS

## 2024-02-06 PROCEDURE — 20611 DRAIN/INJ JOINT/BURSA W/US: CPT | Mod: LT

## 2024-02-06 NOTE — PROCEDURE
[de-identified] : Injection: US guided Left knee joint. Indication: Osteoarthritis.  A discussion was had with the patient regarding this procedure and all questions were answered. All risks, benefits and alternatives were discussed. These included but were not limited to bleeding, infection, and allergic reaction. Alcohol was used to clean the skin, and chlorhexidine was used to sterilize and prep the area in the supero-lateral aspect of the Left knee. Ethyl chloride spray was then used as a topical anesthetic. A 22-gauge needle was used to inject 2 cc of Euflexxa into the Left knee. Ultrasound guidance was used for adequate placement of needle. A sterile bandage was then applied. The patient tolerated the procedure well and there were no complications. Post injection instructions were given.

## 2024-02-06 NOTE — DISCUSSION/SUMMARY
[de-identified] : I had a lengthy discussion with the patient regarding their condition.  Postinjection instructions were given.  The patient will monitor for signs of infection and can apply cryotherapy or moist heat.  The patient will follow-up as needed. They will call us with any issues or concerns.

## 2024-02-06 NOTE — HISTORY OF PRESENT ILLNESS
[de-identified] : Patient presents today for his third left knee Euflexxa injection.  He notes some improvement of his pain.  He is currently applying for authorization for spine surgery.  He denies fevers chills or signs of infection.

## 2024-02-06 NOTE — PHYSICAL EXAM
[de-identified] : Physical Exam:  General: Well appearing, no acute distress  Neurologic: A&Ox3, No focal deficits  Head: NCAT without abrasions, lacerations, or ecchymosis to head, face, or scalp  Eyes: No scleral icterus, no gross abnormalities  Respiratory: Equal chest wall expansion bilaterally, no accessory muscle use  Lymphatic: No lymphadenopathy palpated  Skin: Warm and dry  Psychiatric: Normal mood and affect  The Left knee is examined and reveals no swelling, effusion,  ecchymosis or erythema. Calf and thigh is soft and nontender. 2+PT pulses. Sensation is intact distally.

## 2024-02-22 ENCOUNTER — APPOINTMENT (OUTPATIENT)
Dept: ORTHOPEDIC SURGERY | Facility: CLINIC | Age: 29
End: 2024-02-22
Payer: OTHER MISCELLANEOUS

## 2024-02-22 DIAGNOSIS — S83.92XA SPRAIN OF UNSPECIFIED SITE OF LEFT KNEE, INITIAL ENCOUNTER: ICD-10-CM

## 2024-02-22 DIAGNOSIS — S73.191A OTHER SPRAIN OF RIGHT HIP, INITIAL ENCOUNTER: ICD-10-CM

## 2024-02-22 PROCEDURE — 99213 OFFICE O/P EST LOW 20 MIN: CPT

## 2024-02-22 NOTE — ADDENDUM
[FreeTextEntry1] : Documented by Georgette Tang acting as a scribe for Dr. Guan on 02/22/2024. All medical record entries made by the Scribe were at my, Dr. Guan's, direction and personally dictated by me on 02/22/2024. I have reviewed the chart and agree that the record accurately reflects my personal performance of the history, physical exam, procedure and imaging.

## 2024-02-22 NOTE — PHYSICAL EXAM
[de-identified] : Physical Exam:  General: Well appearing, no acute distress  Neurologic: A&Ox3, No focal deficits  Head: NCAT without abrasions, lacerations, or ecchymosis to head, face, or scalp  Eyes: No scleral icterus, no gross abnormalities  Respiratory: Equal chest wall expansion bilaterally, no accessory muscle use  Lymphatic: No lymphadenopathy palpated  Skin: Warm and dry  Psychiatric: Normal mood and affect  The Left knee is examined and reveals no swelling, effusion,  ecchymosis or erythema. Calf and thigh is soft and nontender. 2+PT pulses. Sensation is intact distally.

## 2024-02-22 NOTE — HISTORY OF PRESENT ILLNESS
[de-identified] : AYAKA is a 28 year male here today for f/u left knee pain. He had three rounds of Euflexxa injections that improved his symptoms. He reports spasming pain over his right hip and neck pain as well.

## 2024-02-22 NOTE — DISCUSSION/SUMMARY
[de-identified] : We had a thorough discussion regarding the nature of his pain, the pathophysiology, as well as all treatment options. Conservative measures of treatment include rest until asymptomatic, activity avoidance, NSAID's PRN, application to ice to the area 2-3x daily for 20 minutes, with gradual return to activities. The patient should f/u in 2-3 months.

## 2024-04-11 ENCOUNTER — APPOINTMENT (OUTPATIENT)
Dept: ORTHOPEDIC SURGERY | Facility: CLINIC | Age: 29
End: 2024-04-11
Payer: OTHER MISCELLANEOUS

## 2024-04-11 VITALS — WEIGHT: 225 LBS | BODY MASS INDEX: 39.87 KG/M2 | HEIGHT: 63 IN

## 2024-04-11 PROCEDURE — 99214 OFFICE O/P EST MOD 30 MIN: CPT

## 2024-04-11 NOTE — HISTORY OF PRESENT ILLNESS
[de-identified] : AYAKA is a 28 year male here today for f/u left knee pain, right hip pain. He reports pain over his hip flexor. He notes his knee continues to swell up. He had three rounds of Euflexxa injections that did not improve his symptoms long term.

## 2024-04-11 NOTE — DISCUSSION/SUMMARY
[de-identified] : We had a thorough discussion regarding the nature of his pain, the pathophysiology, as well as all treatment options. Based on clinical exam and radiograph findings they have hip flexor tendonitis. I am referring the patient to have an injection into his right hip. He should discuss his condition with Dr. Sagastume. All questions were answered and the patient verbalized understanding. The patient is in agreement with this treatment plan. The pt should f/u with me after seeing Dr. Sagastume.

## 2024-04-11 NOTE — PHYSICAL EXAM
[de-identified] : Physical Exam:  General: Well appearing, no acute distress  Neurologic: A&Ox3, No focal deficits  Head: NCAT without abrasions, lacerations, or ecchymosis to head, face, or scalp  Eyes: No scleral icterus, no gross abnormalities  Respiratory: Equal chest wall expansion bilaterally, no accessory muscle use  Lymphatic: No lymphadenopathy palpated  Skin: Warm and dry  Psychiatric: Normal mood and affect  The Left knee is examined and reveals no swelling, effusion, ecchymosis or erythema. Crepitus and pain with patella mobility. The patient's ROM is from 0-130 degrees. The knee is stable to Lachman testing, as well as anterior and posterior drawer. There is no valgus or varus instability. Calf and thigh are soft and nontender. 2+PT pulses. Sensation is intact distally.   Examination of the right hip reveals no obvious deformity or leg length discrepancy. There is no swelling noted. ASIS tenderness. The patients range of motion is to 110 degrees of hip flexion, 40 degrees of abduction, 20 degrees of adduction, 40 degrees of internal rotation and 45 degrees of external rotation. The patient has 4/5 strength to resisted hip flexion, 5/5 abduction and 5/5 adduction. The patient has a negative QUIRINO and FADIR Test. Negative Forbes Test. Negative resisted SLR test at 30 degrees of hip flexion ([facility]). Negative Piriformis Test. No SI joint instability. There is no pain with logrolling. The calf and thigh are soft and nontender bilaterally. The patient is grossly neurovascularly intact distally.  [de-identified] : No new imaging today

## 2024-04-11 NOTE — ADDENDUM
[FreeTextEntry1] : Documented by Georgette Tang acting as a scribe for Dr. Guan on 04/11/2024. All medical record entries made by the Scribe were at my, Dr. Guan's, direction and personally dictated by me on 04/11/2024. I have reviewed the chart and agree that the record accurately reflects my personal performance of the history, physical exam, procedure and imaging.

## 2024-05-20 ENCOUNTER — EMERGENCY (EMERGENCY)
Facility: HOSPITAL | Age: 29
LOS: 1 days | End: 2024-05-20
Attending: STUDENT IN AN ORGANIZED HEALTH CARE EDUCATION/TRAINING PROGRAM
Payer: COMMERCIAL

## 2024-05-20 VITALS
OXYGEN SATURATION: 97 % | HEART RATE: 82 BPM | RESPIRATION RATE: 16 BRPM | SYSTOLIC BLOOD PRESSURE: 102 MMHG | TEMPERATURE: 98 F | DIASTOLIC BLOOD PRESSURE: 72 MMHG

## 2024-05-20 VITALS
RESPIRATION RATE: 18 BRPM | SYSTOLIC BLOOD PRESSURE: 119 MMHG | OXYGEN SATURATION: 97 % | TEMPERATURE: 98 F | WEIGHT: 220.02 LBS | HEART RATE: 109 BPM | DIASTOLIC BLOOD PRESSURE: 83 MMHG | HEIGHT: 63 IN

## 2024-05-20 DIAGNOSIS — Z98.890 OTHER SPECIFIED POSTPROCEDURAL STATES: Chronic | ICD-10-CM

## 2024-05-20 DIAGNOSIS — S02.609A FRACTURE OF MANDIBLE, UNSPECIFIED, INITIAL ENCOUNTER FOR CLOSED FRACTURE: Chronic | ICD-10-CM

## 2024-05-20 DIAGNOSIS — Z98.89 OTHER SPECIFIED POSTPROCEDURAL STATES: Chronic | ICD-10-CM

## 2024-05-20 LAB
ALBUMIN SERPL ELPH-MCNC: 4.4 G/DL — SIGNIFICANT CHANGE UP (ref 3.3–5.2)
ALP SERPL-CCNC: 64 U/L — SIGNIFICANT CHANGE UP (ref 40–120)
ALT FLD-CCNC: 22 U/L — SIGNIFICANT CHANGE UP
ANION GAP SERPL CALC-SCNC: 15 MMOL/L — SIGNIFICANT CHANGE UP (ref 5–17)
AST SERPL-CCNC: 26 U/L — SIGNIFICANT CHANGE UP
BASOPHILS # BLD AUTO: 0.02 K/UL — SIGNIFICANT CHANGE UP (ref 0–0.2)
BASOPHILS NFR BLD AUTO: 0.2 % — SIGNIFICANT CHANGE UP (ref 0–2)
BILIRUB SERPL-MCNC: 0.5 MG/DL — SIGNIFICANT CHANGE UP (ref 0.4–2)
BUN SERPL-MCNC: 11.7 MG/DL — SIGNIFICANT CHANGE UP (ref 8–20)
CALCIUM SERPL-MCNC: 9.1 MG/DL — SIGNIFICANT CHANGE UP (ref 8.4–10.5)
CHLORIDE SERPL-SCNC: 97 MMOL/L — SIGNIFICANT CHANGE UP (ref 96–108)
CO2 SERPL-SCNC: 25 MMOL/L — SIGNIFICANT CHANGE UP (ref 22–29)
CREAT SERPL-MCNC: 0.97 MG/DL — SIGNIFICANT CHANGE UP (ref 0.5–1.3)
EGFR: 109 ML/MIN/1.73M2 — SIGNIFICANT CHANGE UP
EOSINOPHIL # BLD AUTO: 0.29 K/UL — SIGNIFICANT CHANGE UP (ref 0–0.5)
EOSINOPHIL NFR BLD AUTO: 2.4 % — SIGNIFICANT CHANGE UP (ref 0–6)
GLUCOSE SERPL-MCNC: 84 MG/DL — SIGNIFICANT CHANGE UP (ref 70–99)
HCT VFR BLD CALC: 48.8 % — SIGNIFICANT CHANGE UP (ref 39–50)
HGB BLD-MCNC: 16.5 G/DL — SIGNIFICANT CHANGE UP (ref 13–17)
IMM GRANULOCYTES NFR BLD AUTO: 0.4 % — SIGNIFICANT CHANGE UP (ref 0–0.9)
LIDOCAIN IGE QN: 16 U/L — LOW (ref 22–51)
LYMPHOCYTES # BLD AUTO: 16.9 % — SIGNIFICANT CHANGE UP (ref 13–44)
LYMPHOCYTES # BLD AUTO: 2.07 K/UL — SIGNIFICANT CHANGE UP (ref 1–3.3)
MCHC RBC-ENTMCNC: 28.5 PG — SIGNIFICANT CHANGE UP (ref 27–34)
MCHC RBC-ENTMCNC: 33.8 GM/DL — SIGNIFICANT CHANGE UP (ref 32–36)
MCV RBC AUTO: 84.4 FL — SIGNIFICANT CHANGE UP (ref 80–100)
MONOCYTES # BLD AUTO: 0.84 K/UL — SIGNIFICANT CHANGE UP (ref 0–0.9)
MONOCYTES NFR BLD AUTO: 6.9 % — SIGNIFICANT CHANGE UP (ref 2–14)
NEUTROPHILS # BLD AUTO: 8.97 K/UL — HIGH (ref 1.8–7.4)
NEUTROPHILS NFR BLD AUTO: 73.2 % — SIGNIFICANT CHANGE UP (ref 43–77)
PLATELET # BLD AUTO: 348 K/UL — SIGNIFICANT CHANGE UP (ref 150–400)
POTASSIUM SERPL-MCNC: 4.4 MMOL/L — SIGNIFICANT CHANGE UP (ref 3.5–5.3)
POTASSIUM SERPL-SCNC: 4.4 MMOL/L — SIGNIFICANT CHANGE UP (ref 3.5–5.3)
PROT SERPL-MCNC: 7.6 G/DL — SIGNIFICANT CHANGE UP (ref 6.6–8.7)
RBC # BLD: 5.78 M/UL — SIGNIFICANT CHANGE UP (ref 4.2–5.8)
RBC # FLD: 13.1 % — SIGNIFICANT CHANGE UP (ref 10.3–14.5)
SODIUM SERPL-SCNC: 137 MMOL/L — SIGNIFICANT CHANGE UP (ref 135–145)
WBC # BLD: 12.24 K/UL — HIGH (ref 3.8–10.5)
WBC # FLD AUTO: 12.24 K/UL — HIGH (ref 3.8–10.5)

## 2024-05-20 PROCEDURE — 99285 EMERGENCY DEPT VISIT HI MDM: CPT

## 2024-05-20 RX ORDER — SODIUM CHLORIDE 9 MG/ML
1000 INJECTION INTRAMUSCULAR; INTRAVENOUS; SUBCUTANEOUS ONCE
Refills: 0 | Status: COMPLETED | OUTPATIENT
Start: 2024-05-20 | End: 2024-05-20

## 2024-05-20 RX ORDER — ONDANSETRON 8 MG/1
4 TABLET, FILM COATED ORAL ONCE
Refills: 0 | Status: COMPLETED | OUTPATIENT
Start: 2024-05-20 | End: 2024-05-20

## 2024-05-20 RX ORDER — KETOROLAC TROMETHAMINE 30 MG/ML
15 SYRINGE (ML) INJECTION ONCE
Refills: 0 | Status: DISCONTINUED | OUTPATIENT
Start: 2024-05-20 | End: 2024-05-20

## 2024-05-20 RX ORDER — ACETAMINOPHEN 500 MG
1000 TABLET ORAL ONCE
Refills: 0 | Status: COMPLETED | OUTPATIENT
Start: 2024-05-20 | End: 2024-05-20

## 2024-05-20 RX ORDER — MORPHINE SULFATE 50 MG/1
4 CAPSULE, EXTENDED RELEASE ORAL ONCE
Refills: 0 | Status: DISCONTINUED | OUTPATIENT
Start: 2024-05-20 | End: 2024-05-20

## 2024-05-20 RX ADMIN — Medication 400 MILLIGRAM(S): at 23:45

## 2024-05-20 RX ADMIN — ONDANSETRON 4 MILLIGRAM(S): 8 TABLET, FILM COATED ORAL at 20:32

## 2024-05-20 RX ADMIN — Medication 15 MILLIGRAM(S): at 22:05

## 2024-05-20 RX ADMIN — ONDANSETRON 4 MILLIGRAM(S): 8 TABLET, FILM COATED ORAL at 20:27

## 2024-05-20 RX ADMIN — SODIUM CHLORIDE 1000 MILLILITER(S): 9 INJECTION INTRAMUSCULAR; INTRAVENOUS; SUBCUTANEOUS at 20:32

## 2024-05-20 RX ADMIN — MORPHINE SULFATE 4 MILLIGRAM(S): 50 CAPSULE, EXTENDED RELEASE ORAL at 20:27

## 2024-05-20 NOTE — ED PROVIDER NOTE - NSFOLLOWUPINSTRUCTIONS_ED_ALL_ED_FT
- Prescription sent to pharmacy.  - Increase fluids.  - [place] diet as tolerated. Avoid citrus based food/drink, spicy/greasy foods, milk, caffeine.   - Please call prescribing doctor of Wegovy today.   - Please call to schedule follow up appointment with your primary care physician within 24-48 hours.  - Please seek immediate medical attention for any new/worsening, signs/symptoms, or concerns including but not limited to worsening abdominal pain, vomiting.     Feel better!      Acute Abdominal Pain    WHAT YOU NEED TO KNOW:    What do I need to know about acute abdominal pain? Acute abdominal pain usually starts suddenly and gets worse quickly.     What are minor causes of acute abdominal pain?   •An allergic reaction to food, or food poisoning      •Stress      •Acid reflux      •Constipation      •Monthly period pain in females      What are serious causes of acute abdominal pain?   •Inflammation or rupture of your appendix      •Swelling or an infection in your abdomen or organ      •A blockage in your bowels      •An ulcer or a tear in your esophagus, stomach, or intestines      •Bleeding in your abdomen or an organ      •Stones in your kidney or gallbladder      •Diseases of the fallopian tubes or ovaries      •An ectopic pregnancy      How is the cause of acute abdominal pain diagnosed? Your healthcare provider will ask about your signs and symptoms. Tell the provider when your symptoms started and about any recent travel or surgery. Also tell him what makes the pain better or worse, and what treatments you have tried. The provider will examine you. Based on what your provider finds from the exam, and your symptoms, you may need other tests. Examples include blood or urine tests, an ultrasound, a CT scan, or an endoscopy.     How is acute abdominal pain treated? Treatment may depend on the cause of your abdominal pain. You may need any of the following:   •Medicines may be given to decrease pain, treat an infection, and manage your symptoms, such as constipation.       •Surgery may be needed to treat a serious cause of abdominal pain. Examples include surgery to treat appendicitis or a blockage in your bowels.       How can I manage my symptoms?   •Apply heat on your abdomen for 20 to 30 minutes every 2 hours for as many days as directed. Heat helps decrease pain and muscle spasms.       •Make changes to the food you eat as directed. Do not eat foods that cause abdominal pain or other symptoms. Eat small meals more often. ?Eat more high-fiber foods if you are constipated. High-fiber foods include fruits, vegetables, whole-grain foods, and legumes.       ?Do not eat foods that cause gas if you have bloating. Examples include broccoli, cabbage, and cauliflower. Do not drink soda or carbonated drinks, because these may also cause gas.       ?Do not eat foods or drinks that contain sorbitol or fructose if you have diarrhea and bloating. Some examples are fruit juices, candy, jelly, and sugar-free gum.       ?Do not eat high-fat foods, such as fried foods, cheeseburgers, hot dogs, and desserts.      ?Limit or do not drink caffeine. Caffeine may make symptoms, such as heart burn or nausea, worse.       ?Drink plenty of liquids to prevent dehydration from diarrhea or vomiting. Ask your healthcare provider how much liquid to drink each day and which liquids are best for you.       •Manage your stress. Stress may cause abdominal pain. Your healthcare provider may recommend relaxation techniques and deep breathing exercises to help decrease your stress. Your healthcare provider may recommend you talk to someone about your stress or anxiety, such as a counselor or a trusted friend. Get plenty of sleep and exercise regularly.       •Limit or do not drink alcohol. Alcohol can make your abdominal pain worse. Ask your healthcare provider if it is safe for you to drink alcohol. Also ask how much is safe for you to drink.       •Do not smoke. Nicotine and other chemicals in cigarettes can damage your esophagus and stomach. Ask your healthcare provider for information if you currently smoke and need help to quit. E-cigarettes or smokeless tobacco still contain nicotine. Talk to your healthcare provider before you use these products.       When should I seek immediate care?   •You vomit blood or cannot stop vomiting.      •You have blood in your bowel movement or it looks like tar.       •You have bleeding from your rectum.       •Your abdomen is larger than usual, more painful, and hard.       •You have severe pain in your abdomen.       •You stop passing gas and having bowel movements.       •You feel weak, dizzy, or faint.      When should I contact my healthcare provider?   •You have a fever.      •You have new signs and symptoms.      •Your symptoms do not get better with treatment.       •You have questions or concerns about your condition or care.      CARE AGREEMENT:    You have the right to help plan your care. Learn about your health condition and how it may be treated. Discuss treatment options with your healthcare providers to decide what care you want to receive. You always have the right to refuse treatment.

## 2024-05-20 NOTE — ED PROVIDER NOTE - CLINICAL SUMMARY MEDICAL DECISION MAKING FREE TEXT BOX
RICHARD Gutierrez 0222: 30 yo male on Wegvoy as of 3 weeks ago presents with abdominal pain and vomiting. Mild leukocytosis, otherwise labs unremarkable. CT negative. Received Pepcid, Maalox, viscous lido with some improvement of pain. Offered patient OBS for GI evaluation in AM. At this time patient would like to be discharged. Advised follow up with prescribing doctor of Wegovy and GI. Strict return precautions. Medically stable for discharge.

## 2024-05-20 NOTE — ED ADULT TRIAGE NOTE - CHIEF COMPLAINT QUOTE
pt c/o abd pain, vomiting since last night, sent from urgent care for r/o gall bladder or pancreatitis  A&Ox3, resp wnl,

## 2024-05-20 NOTE — ED PROVIDER NOTE - PHYSICAL EXAMINATION
Constitutional - well-developed.   Head - NCAT. Airway patent.   Eyes - PERRL.   CV - RRR. no murmur. no edema.   Pulm - CTAB.   Abd - soft, moderate upper abd ttp. no rebound. no guarding.   Neuro - A&Ox3. strength 5/5 x4. sensation intact x4. normal gait.   Skin - No rash. .  MSK - normal ROM.

## 2024-05-20 NOTE — ED PROVIDER NOTE - PROGRESS NOTE DETAILS
RICHARD Gutierrez: Patient evaluated by intake physician. HPI/ROS/PE as noted above. Will follow up plan per intake physician and continue to assess patient. RICHARD Gutierrez: All results reviewed with patient. Still in pain. Medications ordered. Reassess, RICHARD Gutierrez: All results reviewed with patient. Still in pain. Medications ordered. Reassess. RICHARD Gutierrez: Received Pepcid, Maalox, viscous lido with some improvement of pain. Offered patient OBS for GI evaluation in AM. At this time patient would like to be discharged. Advised follow up with prescribing doctor of Wegovy and GI. Strict return precautions.

## 2024-05-20 NOTE — ED ADULT TRIAGE NOTE - ARRIVAL FROM
Jen would like patient to come in on Monday afternoon instead of waiting until Wed evening to be seen for her recurrent uti's.    Patient will come in on Monday at 11:45 am and leave a urine first.     Home

## 2024-05-20 NOTE — ED PROVIDER NOTE - OBJECTIVE STATEMENT
Pt is a 28 yo M co upper abd pain. Pt states that the pain started friday but lasted a couple of hours and went away. Pt states that the pain came back last night and has not gone away. Pt states that the pain is severe and radiates across the entire upper abdomen. Pt states that it has been associated with n/v. Pt states that he started wegovy 3 wks ago for weight loss. Pt went to  and was sent in for eval.

## 2024-05-20 NOTE — ED ADULT NURSE REASSESSMENT NOTE - NS ED NURSE REASSESS COMMENT FT1
plan of care assumed from JOHNNY MUELLER @1527. no S&S of acute distress, pt resting comfortably on stretcher. c/o mid abd pain,n/v since friday. recently staterd a wegovy (takes 1shot/week) and thursday was last dose. pt denies CP/pressure/tightness, palpitations, SOB/dyspnea, dizziness/headache/lightheadedness/blurry vision, tingling/numbness, fevers/chills,urinary S&S. awaiting ct scan. plan of care reviewed with pt. pt in understanding of plan of care.

## 2024-05-20 NOTE — ED PROVIDER NOTE - NS ED ROS FT
No fever/chills   No photophobia/eye pain/changes in visio,   No ear pain/sore throat/dysphagia   No chest pain/palpitations  No SOB/cough/wheeze/stridor   +abdominal pain, + N/V no diarrhea  No dysuria/frequency/discharge   No neck/back pain,   No rash  No changes in neurological status/function.

## 2024-05-20 NOTE — ED PROVIDER NOTE - ATTENDING APP SHARED VISIT CONTRIBUTION OF CARE
labs and imaging reviewed.  Pt feeling better.  possibly medication related, possibly gastritis. lipase normal. CT with no evidence of pancreatitis. will d/c with return and f/up instrutions.

## 2024-05-20 NOTE — ED PROVIDER NOTE - CARE PROVIDER_API CALL
Yobani Maldonado  Gastroenterology  [address], [facility] [address]  [place] [address]  Phone: [phone]  Fax: [phone]  Follow Up Time:

## 2024-05-20 NOTE — ED PROVIDER NOTE - PATIENT PORTAL LINK FT
You can access the FollowMyHealth Patient Portal offered by [facility] by registering at the following website: [email or web address]. By joining [employer]’s FollowMyHealth portal, you will also be able to view your health information using other applications (apps) compatible with our system.

## 2024-05-21 PROCEDURE — 99284 EMERGENCY DEPT VISIT MOD MDM: CPT | Mod: 25

## 2024-05-21 PROCEDURE — 74177 CT ABD & PELVIS W/CONTRAST: CPT | Mod: MC

## 2024-05-21 PROCEDURE — 85025 COMPLETE CBC W/AUTO DIFF WBC: CPT

## 2024-05-21 PROCEDURE — 80053 COMPREHEN METABOLIC PANEL: CPT

## 2024-05-21 PROCEDURE — 36415 COLL VENOUS BLD VENIPUNCTURE: CPT

## 2024-05-21 PROCEDURE — 96375 TX/PRO/DX INJ NEW DRUG ADDON: CPT

## 2024-05-21 PROCEDURE — 83690 ASSAY OF LIPASE: CPT

## 2024-05-21 PROCEDURE — 74177 CT ABD & PELVIS W/CONTRAST: CPT | Mod: 26,MC

## 2024-05-21 PROCEDURE — 96374 THER/PROPH/DIAG INJ IV PUSH: CPT

## 2024-05-21 RX ORDER — OMEPRAZOLE 10 MG/1
1 CAPSULE, DELAYED RELEASE ORAL
Qty: 7 | Refills: 0
Start: 2024-05-21 | End: 2024-05-27

## 2024-05-21 RX ORDER — LIDOCAINE 4 G/100G
10 CREAM TOPICAL ONCE
Refills: 0 | Status: COMPLETED | OUTPATIENT
Start: 2024-05-21 | End: 2024-05-21

## 2024-05-21 RX ORDER — SUCRALFATE 1 G
10 TABLET ORAL
Qty: 200 | Refills: 0
Start: 2024-05-21 | End: 2024-05-25

## 2024-05-21 RX ORDER — FAMOTIDINE 10 MG/ML
20 INJECTION INTRAVENOUS ONCE
Refills: 0 | Status: COMPLETED | OUTPATIENT
Start: 2024-05-21 | End: 2024-05-21

## 2024-05-21 RX ORDER — ONDANSETRON 8 MG/1
1 TABLET, FILM COATED ORAL
Qty: 9 | Refills: 0
Start: 2024-05-21 | End: 2024-05-23

## 2024-05-21 RX ADMIN — LIDOCAINE 10 MILLILITER(S): 4 CREAM TOPICAL at 01:36

## 2024-05-21 RX ADMIN — FAMOTIDINE 20 MILLIGRAM(S): 10 INJECTION INTRAVENOUS at 01:36

## 2024-05-21 RX ADMIN — Medication 30 MILLILITER(S): at 01:36

## 2024-05-23 ENCOUNTER — TRANSCRIPTION ENCOUNTER (OUTPATIENT)
Age: 29
End: 2024-05-23

## 2024-05-23 ENCOUNTER — APPOINTMENT (OUTPATIENT)
Dept: ORTHOPEDIC SURGERY | Facility: CLINIC | Age: 29
End: 2024-05-23
Payer: OTHER MISCELLANEOUS

## 2024-05-23 DIAGNOSIS — S73.191D OTHER SPRAIN OF RIGHT HIP, SUBSEQUENT ENCOUNTER: ICD-10-CM

## 2024-05-23 DIAGNOSIS — M17.12 UNILATERAL PRIMARY OSTEOARTHRITIS, LEFT KNEE: ICD-10-CM

## 2024-05-23 DIAGNOSIS — M76.891 OTHER SPECIFIED ENTHESOPATHIES OF RIGHT LOWER LIMB, EXCLUDING FOOT: ICD-10-CM

## 2024-05-23 PROCEDURE — 99214 OFFICE O/P EST MOD 30 MIN: CPT

## 2024-05-23 NOTE — DISCUSSION/SUMMARY
[de-identified] : We had a thorough discussion regarding the nature of his pain, the pathophysiology, as well as all treatment options. Based on clinical exam and radiograph findings they have hip flexor tendonitis. The patient states they are waiting for approval to have an injection into his right hip. I discussed with the patient PRP injections for the left knee. Patient will follow up after PRP injection approval. All questions were answered and the patient verbalized understanding. The patient is in agreement with this treatment plan.

## 2024-05-23 NOTE — ADDENDUM
[FreeTextEntry1] :  Documented by Ascencion Vazquez acting as a scribe for Dr. Guan and Tyrone Clayton PA-C on 05/23/2024. All medical record entries made by the Scribe were at my, Dr. Guan, and Tyrone Clayton's, direction and personally dictated by me on 05/23/2024. I have reviewed the chart and agree that the record accurately reflects my personal performance of the history, physical exam, procedure and imaging.

## 2024-05-23 NOTE — HISTORY OF PRESENT ILLNESS
[de-identified] : AYAKA is a 28 year male here today for f/u left knee pain, right hip pain. He reports pain over his hip flexor. He notes his knee continues to swell up. He had three rounds of Euflexxa injections that did not improve his symptoms long term.

## 2024-05-23 NOTE — PHYSICAL EXAM
[de-identified] : Physical Exam:  General: Well appearing, no acute distress  Neurologic: A&Ox3, No focal deficits  Head: NCAT without abrasions, lacerations, or ecchymosis to head, face, or scalp  Eyes: No scleral icterus, no gross abnormalities  Respiratory: Equal chest wall expansion bilaterally, no accessory muscle use  Lymphatic: No lymphadenopathy palpated  Skin: Warm and dry  Psychiatric: Normal mood and affect  The Left knee is examined and reveals no swelling, effusion, ecchymosis or erythema. Crepitus and pain with patella mobility and medial condyle tenderness. The patient's ROM is from 0-130 degrees. The knee is stable to Lachman testing, as well as anterior and posterior drawer. There is no valgus or varus instability. Calf and thigh are soft and nontender. 2+PT pulses. Sensation is intact distally.   Examination of the right hip reveals no obvious deformity or leg length discrepancy. There is no swelling noted. ASIS tenderness. The patients range of motion is to 110 degrees of hip flexion, 40 degrees of abduction, 20 degrees of adduction, 40 degrees of internal rotation and 45 degrees of external rotation. The patient has 4/5 strength to resisted hip flexion, 5/5 abduction and 5/5 adduction. The patient has a negative QUIRINO and FADIR Test. Negative Forbes Test. Negative resisted SLR test at 30 degrees of hip flexion ([facility]). Negative Piriformis Test. No SI joint instability. There is no pain with logrolling. The calf and thigh are soft and nontender bilaterally. The patient is grossly neurovascularly intact distally.  [de-identified] : No new imaging today

## 2024-07-09 ENCOUNTER — APPOINTMENT (OUTPATIENT)
Dept: ORTHOPEDIC SURGERY | Facility: CLINIC | Age: 29
End: 2024-07-09
Payer: OTHER MISCELLANEOUS

## 2024-07-09 DIAGNOSIS — M17.12 UNILATERAL PRIMARY OSTEOARTHRITIS, LEFT KNEE: ICD-10-CM

## 2024-07-09 PROCEDURE — 99214 OFFICE O/P EST MOD 30 MIN: CPT

## 2024-07-12 ENCOUNTER — NON-APPOINTMENT (OUTPATIENT)
Age: 29
End: 2024-07-12

## 2024-08-22 ENCOUNTER — APPOINTMENT (OUTPATIENT)
Dept: ORTHOPEDIC SURGERY | Facility: CLINIC | Age: 29
End: 2024-08-22
Payer: OTHER MISCELLANEOUS

## 2024-08-22 VITALS — HEIGHT: 63 IN | WEIGHT: 225 LBS | BODY MASS INDEX: 39.87 KG/M2

## 2024-08-22 DIAGNOSIS — M17.12 UNILATERAL PRIMARY OSTEOARTHRITIS, LEFT KNEE: ICD-10-CM

## 2024-08-22 DIAGNOSIS — S73.191D OTHER SPRAIN OF RIGHT HIP, SUBSEQUENT ENCOUNTER: ICD-10-CM

## 2024-08-22 PROCEDURE — 99214 OFFICE O/P EST MOD 30 MIN: CPT

## 2024-08-22 NOTE — REASON FOR VISIT
[Follow-Up Visit] : a follow-up visit for [Workers' Comp: Date of Injury: _______] : This visit is related to worker's compensation. Date of Injury: [unfilled] [FreeTextEntry2] : lt knee; rt hip pain

## 2024-08-22 NOTE — PHYSICAL EXAM
[de-identified] : Physical Exam:  General: Well appearing, no acute distress  Neurologic: A&Ox3, No focal deficits  Head: NCAT without abrasions, lacerations, or ecchymosis to head, face, or scalp  Eyes: No scleral icterus, no gross abnormalities  Respiratory: Equal chest wall expansion bilaterally, no accessory muscle use  Lymphatic: No lymphadenopathy palpated  Skin: Warm and dry  Psychiatric: Normal mood and affect  The Left knee is examined and reveals moderate effusion. No ecchymosis or erythema. Atrophy of quadriceps. Crepitus at patellofemoral joint and pain with patella mobility and medial condyle tenderness. The patient's ROM is from 0-130 degrees. The knee is stable to Lachman testing, as well as anterior and posterior drawer. There is no valgus or varus instability. Calf and thigh are soft and nontender. 2+PT pulses. Sensation is intact distally.   Examination of the right hip reveals no obvious deformity or leg length discrepancy. There is no swelling noted. ASIS tenderness. The patients range of motion is to 110 degrees of hip flexion, 40 degrees of abduction, 20 degrees of adduction, 40 degrees of internal rotation and 45 degrees of external rotation. The patient has 4/5 strength to resisted hip flexion, 5/5 abduction and 5/5 adduction. The patient has a negative QUIRINO and FADIR Test. Negative Forbes Test. Negative resisted SLR test at 30 degrees of hip flexion ([facility]). Negative Piriformis Test. No SI joint instability. There is no pain with logrolling. The calf and thigh are soft and nontender bilaterally. The patient is grossly neurovascularly intact distally.  [de-identified] : No new imaging.

## 2024-08-22 NOTE — CONSULT LETTER
[Dear  ___] : Dear  [unfilled], [Consult Letter:] : I had the pleasure of evaluating your patient, [unfilled]. [Please see my note below.] : Please see my note below. [Sincerely,] : Sincerely, [FreeTextEntry3] : Dr. Daniel Guan

## 2024-08-22 NOTE — PHYSICAL EXAM
[de-identified] : Physical Exam:  General: Well appearing, no acute distress  Neurologic: A&Ox3, No focal deficits  Head: NCAT without abrasions, lacerations, or ecchymosis to head, face, or scalp  Eyes: No scleral icterus, no gross abnormalities  Respiratory: Equal chest wall expansion bilaterally, no accessory muscle use  Lymphatic: No lymphadenopathy palpated  Skin: Warm and dry  Psychiatric: Normal mood and affect  The Left knee is examined and reveals moderate effusion. No ecchymosis or erythema. Atrophy of quadriceps. Crepitus at patellofemoral joint and pain with patella mobility and medial condyle tenderness. The patient's ROM is from 0-130 degrees. The knee is stable to Lachman testing, as well as anterior and posterior drawer. There is no valgus or varus instability. Calf and thigh are soft and nontender. 2+PT pulses. Sensation is intact distally.   Examination of the right hip reveals no obvious deformity or leg length discrepancy. There is no swelling noted. ASIS tenderness. The patients range of motion is to 110 degrees of hip flexion, 40 degrees of abduction, 20 degrees of adduction, 40 degrees of internal rotation and 45 degrees of external rotation. The patient has 4/5 strength to resisted hip flexion, 5/5 abduction and 5/5 adduction. The patient has a negative QUIRINO and FADIR Test. Negative Forbes Test. Negative resisted SLR test at 30 degrees of hip flexion ([facility]). Negative Piriformis Test. No SI joint instability. There is no pain with logrolling. The calf and thigh are soft and nontender bilaterally. The patient is grossly neurovascularly intact distally.  [de-identified] : No new imaging.

## 2024-08-22 NOTE — ADDENDUM
[FreeTextEntry1] : Documented by Sasha Nettles acting as a scribe for Dr. Guan and Tyrone Clayton PA-C on 08/22/2024. All medical record entries made by the Scribe were at my, Dr. Guan, and Tyrone Clayton's, direction and personally dictated by me on 08/22/2024. I have reviewed the chart and agree that the record accurately reflects my personal performance of the history, physical exam, procedure and imaging.

## 2024-08-22 NOTE — HISTORY OF PRESENT ILLNESS
[de-identified] : AYAKA CROSS is a 29 year old male being seen for left hip and right knee pain. States he has been dealing with this pain for two years without improvement to symproms. States knee pain is localized both medially and laterlly with associated swelling.

## 2024-08-22 NOTE — HISTORY OF PRESENT ILLNESS
[de-identified] : AYAKA CROSS is a 29 year old male being seen for left hip and right knee pain. States he has been dealing with this pain for two years without improvement to symproms. States knee pain is localized both medially and laterlly with associated swelling.

## 2024-08-22 NOTE — DISCUSSION/SUMMARY
[de-identified] : We had a thorough discussion regarding the nature of his pain, the pathophysiology, as well as all treatment options. I discussed operative and non-operative treatment modalities. We do believe a diagnostic arthroscopy is imperative at this time as he has not improved with any conservative treatment we have tried. All risks, benefits and alternatives to the proposed surgical procedure, left knee arthroscopy, were discussed in great detail with the patient. Risks include but are not limited to pain, bleeding, infection, stiffness, medical complications (including DVT, PE, MI), and risks of anesthesia. The patient expressed understanding and all questions were answered.  All questions were answered. The patient is in agreement with this treatment plan. Patient was given prescription of formal physical therapy that he will perform 2x/wk for 6-8 wks.

## 2024-08-22 NOTE — DISCUSSION/SUMMARY
[de-identified] : We had a thorough discussion regarding the nature of his pain, the pathophysiology, as well as all treatment options. I discussed operative and non-operative treatment modalities. We do believe a diagnostic arthroscopy is imperative at this time as he has not improved with any conservative treatment we have tried. All risks, benefits and alternatives to the proposed surgical procedure, left knee arthroscopy, were discussed in great detail with the patient. Risks include but are not limited to pain, bleeding, infection, stiffness, medical complications (including DVT, PE, MI), and risks of anesthesia. The patient expressed understanding and all questions were answered.  All questions were answered. The patient is in agreement with this treatment plan. Patient was given prescription of formal physical therapy that he will perform 2x/wk for 6-8 wks.

## 2024-09-25 ENCOUNTER — NON-APPOINTMENT (OUTPATIENT)
Age: 29
End: 2024-09-25

## 2024-09-26 ENCOUNTER — APPOINTMENT (OUTPATIENT)
Dept: ORTHOPEDIC SURGERY | Facility: CLINIC | Age: 29
End: 2024-09-26
Payer: OTHER MISCELLANEOUS

## 2024-09-26 VITALS — BODY MASS INDEX: 39.87 KG/M2 | WEIGHT: 225 LBS | HEIGHT: 63 IN

## 2024-09-26 DIAGNOSIS — S73.191D OTHER SPRAIN OF RIGHT HIP, SUBSEQUENT ENCOUNTER: ICD-10-CM

## 2024-09-26 DIAGNOSIS — M17.12 UNILATERAL PRIMARY OSTEOARTHRITIS, LEFT KNEE: ICD-10-CM

## 2024-09-26 PROCEDURE — 99214 OFFICE O/P EST MOD 30 MIN: CPT

## 2024-09-26 NOTE — PHYSICAL EXAM
[de-identified] : Physical Exam:  General: Well appearing, no acute distress  Neurologic: A&Ox3, No focal deficits  Head: NCAT without abrasions, lacerations, or ecchymosis to head, face, or scalp  Eyes: No scleral icterus, no gross abnormalities  Respiratory: Equal chest wall expansion bilaterally, no accessory muscle use  Lymphatic: No lymphadenopathy palpated  Skin: Warm and dry  Psychiatric: Normal mood and affect  The Left knee is examined and reveals moderate effusion. No ecchymosis or erythema. Atrophy of quadriceps. Crepitus at patellofemoral joint and pain with patella mobility and medial condyle tenderness. The patient's ROM is from 0-130 degrees. The knee is stable to Lachman testing, as well as anterior and posterior drawer. There is no valgus or varus instability. Calf and thigh are soft and nontender. 2+PT pulses. Sensation is intact distally.   Examination of the right hip reveals no obvious deformity or leg length discrepancy. There is no swelling noted. ASIS tenderness. The patients range of motion is to 110 degrees of hip flexion, 40 degrees of abduction, 20 degrees of adduction, 40 degrees of internal rotation and 45 degrees of external rotation. The patient has 4/5 strength to resisted hip flexion, 5/5 abduction and 5/5 adduction. The patient has a negative QUIRINO and FADIR Test. Negative Forbes Test. Negative resisted SLR test at 30 degrees of hip flexion ([facility]). Negative Piriformis Test. No SI joint instability. There is no pain with logrolling. The calf and thigh are soft and nontender bilaterally. The patient is grossly neurovascularly intact distally.  [de-identified] : No new imaging.

## 2024-09-26 NOTE — DISCUSSION/SUMMARY
[de-identified] : We had a thorough discussion regarding the nature of his pain, the pathophysiology, as well as all treatment options. I discussed operative and non-operative treatment modalities. We do believe a diagnostic arthroscopy is imperative at this time as he has not improved with any conservative treatment we have tried.  BEN report that designates the L knee for surgery secondary to possible meniscus tear. Patient has been having difficulty with day-to-day activities with locking and catching of the knee.  All risks, benefits and alternatives to the proposed surgical procedure, left knee arthroscopy, were discussed in great detail with the patient. Risks include but are not limited to pain, bleeding, infection, stiffness, medical complications (including DVT, PE, MI), and risks of anesthesia. The patient expressed understanding and all questions were answered.  All questions were answered. The patient is in agreement with this treatment plan. I provided him the contact information of my surgical coordinator, Juliet, and will have the patient scheduled accordingly.

## 2024-09-26 NOTE — HISTORY OF PRESENT ILLNESS
[de-identified] : AYAKA CROSS is a 29 year old male being seen for left hip and right knee pain. States he has been dealing with this pain for two years without improvement to symptoms. States knee pain is localized both medially and laterally with associated swelling.

## 2024-09-26 NOTE — DISCUSSION/SUMMARY
[de-identified] : We had a thorough discussion regarding the nature of his pain, the pathophysiology, as well as all treatment options. I discussed operative and non-operative treatment modalities. We do believe a diagnostic arthroscopy is imperative at this time as he has not improved with any conservative treatment we have tried.  BEN report that designates the L knee for surgery secondary to possible meniscus tear. Patient has been having difficulty with day-to-day activities with locking and catching of the knee.  All risks, benefits and alternatives to the proposed surgical procedure, left knee arthroscopy, were discussed in great detail with the patient. Risks include but are not limited to pain, bleeding, infection, stiffness, medical complications (including DVT, PE, MI), and risks of anesthesia. The patient expressed understanding and all questions were answered.  All questions were answered. The patient is in agreement with this treatment plan. I provided him the contact information of my surgical coordinator, Juliet, and will have the patient scheduled accordingly.

## 2024-09-26 NOTE — PHYSICAL EXAM
[de-identified] : Physical Exam:  General: Well appearing, no acute distress  Neurologic: A&Ox3, No focal deficits  Head: NCAT without abrasions, lacerations, or ecchymosis to head, face, or scalp  Eyes: No scleral icterus, no gross abnormalities  Respiratory: Equal chest wall expansion bilaterally, no accessory muscle use  Lymphatic: No lymphadenopathy palpated  Skin: Warm and dry  Psychiatric: Normal mood and affect  The Left knee is examined and reveals moderate effusion. No ecchymosis or erythema. Atrophy of quadriceps. Crepitus at patellofemoral joint and pain with patella mobility and medial condyle tenderness. The patient's ROM is from 0-130 degrees. The knee is stable to Lachman testing, as well as anterior and posterior drawer. There is no valgus or varus instability. Calf and thigh are soft and nontender. 2+PT pulses. Sensation is intact distally.   Examination of the right hip reveals no obvious deformity or leg length discrepancy. There is no swelling noted. ASIS tenderness. The patients range of motion is to 110 degrees of hip flexion, 40 degrees of abduction, 20 degrees of adduction, 40 degrees of internal rotation and 45 degrees of external rotation. The patient has 4/5 strength to resisted hip flexion, 5/5 abduction and 5/5 adduction. The patient has a negative QUIRINO and FADIR Test. Negative Forbes Test. Negative resisted SLR test at 30 degrees of hip flexion ([facility]). Negative Piriformis Test. No SI joint instability. There is no pain with logrolling. The calf and thigh are soft and nontender bilaterally. The patient is grossly neurovascularly intact distally.  [de-identified] : No new imaging.

## 2024-09-26 NOTE — HISTORY OF PRESENT ILLNESS
[de-identified] : AYAKA CROSS is a 29 year old male being seen for left hip and right knee pain. States he has been dealing with this pain for two years without improvement to symptoms. States knee pain is localized both medially and laterally with associated swelling.

## 2024-09-26 NOTE — ADDENDUM
[FreeTextEntry1] : Documented by Sasha Nettles acting as a scribe for Dr. Guan and Tyrone Clayton PA-C on 09/26/2024. All medical record entries made by the Scribe were at my, Dr. Guan, and Tyrone Clayton's, direction and personally dictated by me on 09/26/2024. I have reviewed the chart and agree that the record accurately reflects my personal performance of the history, physical exam, procedure and imaging.

## 2024-11-04 ENCOUNTER — APPOINTMENT (OUTPATIENT)
Dept: ORTHOPEDIC SURGERY | Facility: CLINIC | Age: 29
End: 2024-11-04
Payer: OTHER MISCELLANEOUS

## 2024-11-04 DIAGNOSIS — M17.12 UNILATERAL PRIMARY OSTEOARTHRITIS, LEFT KNEE: ICD-10-CM

## 2024-11-04 DIAGNOSIS — S73.191D OTHER SPRAIN OF RIGHT HIP, SUBSEQUENT ENCOUNTER: ICD-10-CM

## 2024-11-04 PROCEDURE — 99214 OFFICE O/P EST MOD 30 MIN: CPT

## 2024-12-16 ENCOUNTER — APPOINTMENT (OUTPATIENT)
Dept: ORTHOPEDIC SURGERY | Facility: CLINIC | Age: 29
End: 2024-12-16
Payer: OTHER MISCELLANEOUS

## 2024-12-16 DIAGNOSIS — S73.191D OTHER SPRAIN OF RIGHT HIP, SUBSEQUENT ENCOUNTER: ICD-10-CM

## 2024-12-16 DIAGNOSIS — S83.92XA SPRAIN OF UNSPECIFIED SITE OF LEFT KNEE, INITIAL ENCOUNTER: ICD-10-CM

## 2024-12-16 PROCEDURE — 99214 OFFICE O/P EST MOD 30 MIN: CPT

## 2025-01-09 ENCOUNTER — NON-APPOINTMENT (OUTPATIENT)
Age: 30
End: 2025-01-09

## 2025-01-23 ENCOUNTER — APPOINTMENT (OUTPATIENT)
Dept: ORTHOPEDIC SURGERY | Facility: CLINIC | Age: 30
End: 2025-01-23
Payer: OTHER MISCELLANEOUS

## 2025-01-23 DIAGNOSIS — S73.191D OTHER SPRAIN OF RIGHT HIP, SUBSEQUENT ENCOUNTER: ICD-10-CM

## 2025-01-23 DIAGNOSIS — M17.12 UNILATERAL PRIMARY OSTEOARTHRITIS, LEFT KNEE: ICD-10-CM

## 2025-01-23 PROCEDURE — 99214 OFFICE O/P EST MOD 30 MIN: CPT

## 2025-02-25 ENCOUNTER — NON-APPOINTMENT (OUTPATIENT)
Age: 30
End: 2025-02-25

## 2025-02-27 DIAGNOSIS — S83.92XA SPRAIN OF UNSPECIFIED SITE OF LEFT KNEE, INITIAL ENCOUNTER: ICD-10-CM

## 2025-02-27 RX ORDER — ASPIRIN ENTERIC COATED TABLETS 81 MG 81 MG/1
81 TABLET, DELAYED RELEASE ORAL DAILY
Qty: 14 | Refills: 0 | Status: ACTIVE | COMMUNITY
Start: 2025-02-27 | End: 1900-01-01

## 2025-02-27 RX ORDER — OXYCODONE 5 MG/1
5 TABLET ORAL
Qty: 15 | Refills: 0 | Status: ACTIVE | COMMUNITY
Start: 2025-02-27 | End: 1900-01-01

## 2025-02-27 RX ORDER — ONDANSETRON 4 MG/1
4 TABLET ORAL
Qty: 42 | Refills: 0 | Status: COMPLETED | COMMUNITY
Start: 2025-02-27 | End: 2025-03-06

## 2025-02-28 ENCOUNTER — APPOINTMENT (OUTPATIENT)
Dept: ORTHOPEDIC SURGERY | Facility: AMBULATORY SURGERY CENTER | Age: 30
End: 2025-02-28
Payer: OTHER MISCELLANEOUS

## 2025-02-28 PROCEDURE — 29880 ARTHRS KNE SRG MNISECTMY M&L: CPT | Mod: LT

## 2025-03-06 ENCOUNTER — APPOINTMENT (OUTPATIENT)
Dept: ORTHOPEDIC SURGERY | Facility: CLINIC | Age: 30
End: 2025-03-06

## 2025-03-13 ENCOUNTER — APPOINTMENT (OUTPATIENT)
Dept: ORTHOPEDIC SURGERY | Facility: CLINIC | Age: 30
End: 2025-03-13
Payer: OTHER MISCELLANEOUS

## 2025-03-13 VITALS — HEIGHT: 63 IN | BODY MASS INDEX: 39.87 KG/M2 | WEIGHT: 225 LBS

## 2025-03-13 DIAGNOSIS — S83.242D OTHER TEAR OF MEDIAL MENISCUS, CURRENT INJURY, LEFT KNEE, SUBSEQUENT ENCOUNTER: ICD-10-CM

## 2025-03-13 PROCEDURE — 73560 X-RAY EXAM OF KNEE 1 OR 2: CPT | Mod: LT

## 2025-03-13 PROCEDURE — 99024 POSTOP FOLLOW-UP VISIT: CPT

## 2025-03-14 ENCOUNTER — NON-APPOINTMENT (OUTPATIENT)
Age: 30
End: 2025-03-14

## 2025-04-10 ENCOUNTER — APPOINTMENT (OUTPATIENT)
Dept: ORTHOPEDIC SURGERY | Facility: CLINIC | Age: 30
End: 2025-04-10
Payer: OTHER MISCELLANEOUS

## 2025-04-10 VITALS — HEIGHT: 63 IN | BODY MASS INDEX: 39.87 KG/M2 | WEIGHT: 225 LBS

## 2025-04-10 DIAGNOSIS — S73.191D OTHER SPRAIN OF RIGHT HIP, SUBSEQUENT ENCOUNTER: ICD-10-CM

## 2025-04-10 DIAGNOSIS — S83.242D OTHER TEAR OF MEDIAL MENISCUS, CURRENT INJURY, LEFT KNEE, SUBSEQUENT ENCOUNTER: ICD-10-CM

## 2025-04-10 DIAGNOSIS — M17.12 UNILATERAL PRIMARY OSTEOARTHRITIS, LEFT KNEE: ICD-10-CM

## 2025-04-10 PROCEDURE — 99024 POSTOP FOLLOW-UP VISIT: CPT

## 2025-05-22 ENCOUNTER — APPOINTMENT (OUTPATIENT)
Dept: ORTHOPEDIC SURGERY | Facility: CLINIC | Age: 30
End: 2025-05-22
Payer: OTHER MISCELLANEOUS

## 2025-05-22 DIAGNOSIS — S83.242D OTHER TEAR OF MEDIAL MENISCUS, CURRENT INJURY, LEFT KNEE, SUBSEQUENT ENCOUNTER: ICD-10-CM

## 2025-05-22 PROCEDURE — 99024 POSTOP FOLLOW-UP VISIT: CPT

## 2025-08-25 ENCOUNTER — APPOINTMENT (OUTPATIENT)
Dept: ORTHOPEDIC SURGERY | Facility: CLINIC | Age: 30
End: 2025-08-25
Payer: OTHER MISCELLANEOUS

## 2025-08-25 DIAGNOSIS — Z98.890 OTHER SPECIFIED POSTPROCEDURAL STATES: ICD-10-CM

## 2025-08-25 DIAGNOSIS — S83.242D OTHER TEAR OF MEDIAL MENISCUS, CURRENT INJURY, LEFT KNEE, SUBSEQUENT ENCOUNTER: ICD-10-CM

## 2025-08-25 DIAGNOSIS — S73.191D OTHER SPRAIN OF RIGHT HIP, SUBSEQUENT ENCOUNTER: ICD-10-CM

## 2025-08-25 PROCEDURE — 99214 OFFICE O/P EST MOD 30 MIN: CPT
